# Patient Record
Sex: MALE | Race: WHITE | Employment: OTHER | URBAN - METROPOLITAN AREA
[De-identification: names, ages, dates, MRNs, and addresses within clinical notes are randomized per-mention and may not be internally consistent; named-entity substitution may affect disease eponyms.]

---

## 2017-02-22 ENCOUNTER — OFFICE VISIT (OUTPATIENT)
Dept: CARDIOLOGY CLINIC | Age: 77
End: 2017-02-22

## 2017-02-22 VITALS
HEART RATE: 70 BPM | BODY MASS INDEX: 35.36 KG/M2 | HEIGHT: 70 IN | WEIGHT: 247 LBS | SYSTOLIC BLOOD PRESSURE: 128 MMHG | DIASTOLIC BLOOD PRESSURE: 85 MMHG

## 2017-02-22 DIAGNOSIS — I10 ESSENTIAL HYPERTENSION, BENIGN: ICD-10-CM

## 2017-02-22 DIAGNOSIS — I48.20 CHRONIC ATRIAL FIBRILLATION (HCC): ICD-10-CM

## 2017-02-22 DIAGNOSIS — K92.2 GASTROINTESTINAL HEMORRHAGE, UNSPECIFIED GASTROINTESTINAL HEMORRHAGE TYPE: Primary | ICD-10-CM

## 2017-02-22 DIAGNOSIS — E11.9 TYPE 2 DIABETES MELLITUS WITHOUT COMPLICATION, UNSPECIFIED LONG TERM INSULIN USE STATUS: ICD-10-CM

## 2017-02-22 DIAGNOSIS — S06.5XAA SUBDURAL HEMATOMA: ICD-10-CM

## 2017-02-22 RX ORDER — ACETAMINOPHEN 325 MG/1
TABLET ORAL
COMMUNITY
End: 2017-09-05

## 2017-02-22 RX ORDER — LISINOPRIL AND HYDROCHLOROTHIAZIDE 12.5; 2 MG/1; MG/1
TABLET ORAL DAILY
COMMUNITY
End: 2017-09-05

## 2017-02-22 NOTE — MR AVS SNAPSHOT
Visit Information Date & Time Provider Department Dept. Phone Encounter #  
 2/22/2017 10:30 AM Curry Torres MD [facility] [phone] [id number] Follow-up Instructions Return in about 6 months (around 8/22/2017). Your Appointments 8/23/2017  9:45 AM  
Follow Up with Curry Torres MD  
[facility] ([facility]) Appt Note: 6 month follow up  
 [address] [place] [facility] [facility]ly [facility] [address]  
  
   
 [address] [id number] [place] [address] Upcoming Health Maintenance Date Due HEMOGLOBIN A1C Q6M 1940 FOOT EXAM Q1 2/14/1949 MICROALBUMIN Q1 2/14/1949 EYE EXAM RETINAL OR DILATED Q1 2/14/1949 DTaP/Tdap/Td series (1 - Tdap) 2/14/1960 ZOSTER VACCINE AGE 60> 2/14/1999 GLAUCOMA SCREENING Q2Y 2/14/2004 Pneumococcal 65+ Low/Medium Risk (1 of 2 - PCV13) 2/14/2004 MEDICARE YEARLY EXAM 2/14/2004 LIPID PANEL Q1 1/8/2016 INFLUENZA AGE 9 TO ADULT 8/1/2016 Allergies as of 2/22/2017  Review Complete On: 10/26/2016 By: Curry Torres MD  
  
 Severity Noted Reaction Type Reactions Aleve [Naproxen Sodium]  03/13/2015    Other (comments) bleeding Current Immunizations  Never Reviewed No immunizations on file. Not reviewed this visit You Were Diagnosed With   
  
 Codes Comments Gastrointestinal hemorrhage, unspecified gastrointestinal hemorrhage type    -  Primary ICD-10-CM: K92.2 ICD-9-CM: 578.9 Vitals BP  
  
  
  
  
  
 128/85 Vitals History BMI and BSA Data Body Mass Index Body Surface Area  
 35.44 kg/m 2 2.35 m 2 Preferred Pharmacy Pharmacy Name Phone [facility] [address]. [place]. [phone] Your Updated Medication List  
  
   
This list is accurate as of: 2/22/17 10:55 AM.  Always use your most recent med list.  
  
  
  
  
 ACTOS 30 mg tablet Generic drug:  pioglitazone Take 30 mg by mouth daily. aspirin delayed-release 81 mg tablet Take  by mouth daily. CENTRUM SILVER Tab tablet Generic drug:  multivitamins-minerals-lutein Take 1 Tab by mouth. finasteride 5 mg tablet Commonly known as:  PROSCAR Take 1 Tab by mouth daily. glimepiride 4 mg tablet Commonly known as:  AMARYL Take 4 mg by mouth every morning. 1/2 tablet twice a day  
  
 hydroCHLOROthiazide 12.5 mg tablet Commonly known as:  HYDRODIURIL Take 1 Tab by mouth daily. latanoprost 0.005 % ophthalmic solution Commonly known as:  Justin Fitch Administer 1 Drop to both eyes daily. levETIRAcetam 750 mg tablet Commonly known as:  KEPPRA Take 750 mg by mouth two (2) times a day. lisinopril-hydroCHLOROthiazide 20-12.5 mg per tablet Commonly known as:  Newby Bucy Take  by mouth daily. metFORMIN 1,000 mg tablet Commonly known as:  GLUCOPHAGE Take 1,000 mg by mouth two (2) times daily (with meals). metoprolol succinate 100 mg tablet Commonly known as:  TOPROL-XL Take 1 Tab by mouth daily. mirabegron ER 25 mg ER tablet Commonly known as:  MYRBETRIQ Take 1 Tab by mouth daily. PLAVIX 75 mg Tab Generic drug:  clopidogrel Take  by mouth daily. pravastatin 80 mg tablet Commonly known as:  PRAVACHOL Take 40 mg by mouth daily. PriLOSEC 20 mg capsule Generic drug:  omeprazole Take 20 mg by mouth daily. quinapril 20 mg tablet Commonly known as:  ACCUPRIL Take 1 Tab by mouth daily. tamsulosin 0.4 mg capsule Commonly known as:  FLOMAX Take 1 Cap by mouth daily. TYLENOL 325 mg tablet Generic drug:  acetaminophen Take  by mouth. 2 tabs as needed We Performed the Following REFERRAL TO GASTROENTEROLOGY [JAE41 Custom] Comments:  
 Please evaluate patient for h/o GI bleed. Needs anticoagulation Follow-up Instructions Return in about 6 months (around 8/22/2017). Referral Information Referral ID Referred By Referred To  
  
 [id number] Last Reddy Not Available Visits Status Start Date End Date 1 New Request 2/22/17 2/22/18 If your referral has a status of pending review or denied, additional information will be sent to support the outcome of this decision. Patient Instructions High Blood Pressure: Care Instructions Your Care Instructions If your blood pressure is usually above 140/90, you have high blood pressure, or hypertension. That means the top number is 140 or higher or the bottom number is 90 or higher, or both. Despite what a lot of people think, high blood pressure usually doesn't cause headaches or make you feel dizzy or lightheaded. It usually has no symptoms. But it does increase your risk for heart attack, stroke, and kidney or eye damage. The higher your blood pressure, the more your risk increases. Your doctor will give you a goal for your blood pressure. Your goal will be based on your health and your age. An example of a goal is to keep your blood pressure below 140/90. Lifestyle changes, such as eating healthy and being active, are always important to help lower blood pressure. You might also take medicine to reach your blood pressure goal. 
Follow-up care is a key part of your treatment and safety. Be sure to make and go to all appointments, and call your doctor if you are having problems. It's also a good idea to know your test results and keep a list of the medicines you take. How can you care for yourself at home? Medical treatment · If you stop taking your medicine, your blood pressure will go back up. You may take one or more types of medicine to lower your blood pressure. Be safe with medicines. Take your medicine exactly as prescribed. Call your doctor if you think you are having a problem with your medicine. · Talk to your doctor before you start taking aspirin every day. Aspirin can help certain people lower their risk of a heart attack or stroke. But taking aspirin isn't right for everyone, because it can cause serious bleeding. · See your doctor regularly. You may need to see the doctor more often at first or until your blood pressure comes down. · If you are taking blood pressure medicine, talk to your doctor before you take decongestants or anti-inflammatory medicine, such as ibuprofen. Some of these medicines can raise blood pressure. · Learn how to check your blood pressure at home. Lifestyle changes · Stay at a healthy weight. This is especially important if you put on weight around the waist. Losing even 10 pounds can help you lower your blood pressure. · If your doctor recommends it, get more exercise. Walking is a good choice. Bit by bit, increase the amount you walk every day. Try for at least 30 minutes on most days of the week. You also may want to swim, bike, or do other activities. · Avoid or limit alcohol. Talk to your doctor about whether you can drink any alcohol. · Try to limit how much sodium you eat to less than 2,300 milligrams (mg) a day. Your doctor may ask you to try to eat less than 1,500 mg a day. · Eat plenty of fruits (such as bananas and oranges), vegetables, legumes, whole grains, and low-fat dairy products. · Lower the amount of saturated fat in your diet. Saturated fat is found in animal products such as milk, cheese, and meat. Limiting these foods may help you lose weight and also lower your risk for heart disease. · Do not smoke. Smoking increases your risk for heart attack and stroke. If you need help quitting, talk to your doctor about stop-smoking programs and medicines. These can increase your chances of quitting for good. When should you call for help? Call 911 anytime you think you may need emergency care.  This may mean having symptoms that suggest that your blood pressure is causing a serious heart or blood vessel problem. Your blood pressure may be over 180/110. For example, call 911 if: 
· You have symptoms of a heart attack. These may include: ¨ Chest pain or pressure, or a strange feeling in the chest. 
¨ Sweating. ¨ Shortness of breath. ¨ Nausea or vomiting. ¨ Pain, pressure, or a strange feeling in the back, neck, jaw, or upper belly or in one or both shoulders or arms. ¨ Lightheadedness or sudden weakness. ¨ A fast or irregular heartbeat. · You have symptoms of a stroke. These may include: 
¨ Sudden numbness, tingling, weakness, or loss of movement in your face, arm, or leg, especially on only one side of your body. ¨ Sudden vision changes. ¨ Sudden trouble speaking. ¨ Sudden confusion or trouble understanding simple statements. ¨ Sudden problems with walking or balance. ¨ A sudden, severe headache that is different from past headaches. · You have severe back or belly pain. Do not wait until your blood pressure comes down on its own. Get help right away. Call your doctor now or seek immediate care if: 
· Your blood pressure is much higher than normal (such as 180/110 or higher), but you don't have symptoms. · You think high blood pressure is causing symptoms, such as: ¨ Severe headache. ¨ Blurry vision. Watch closely for changes in your health, and be sure to contact your doctor if: 
· Your blood pressure measures 140/90 or higher at least 2 times. That means the top number is 140 or higher or the bottom number is 90 or higher, or both. · You think you may be having side effects from your blood pressure medicine. · Your blood pressure is usually normal, but it goes above normal at least 2 times. Where can you learn more? Go to http://ren-peggy.info/. Enter F993 in the search box to learn more about \"High Blood Pressure: Care Instructions. \" Current as of: August 8, 2016 Content Version: 11.1 © 3245-9313 [employer], [employer]. Care instructions adapted under license by [employer] (which disclaims liability or warranty for this information). If you have questions about a medical condition or this instruction, always ask your healthcare professional. [address] any warranty or liability for your use of this information. Will fax patients demo's and records over to  [facility] office and they will contact patient with an appointment. Introducing [facility]! Alix Landry introduces [employer] media patient portal. Now you can access parts of your medical record, email your doctor's office, and request medication refills online. 1. In your internet browser, go to [email or web address] 2. Click on the First Time User? Click Here link in the Sign In box. You will see the New Member Sign Up page. 3. Enter your [employer] media Access Code exactly as it appears below. You will not need to use this code after youve completed the sign-up process. If you do not sign up before the expiration date, you must request a new code. · [employer] media Access Code: [email or web address] Expires: 5/23/2017 10:13 AM 
 
4. Enter the last four digits of your Social Security Number (xxxx) and Date of Birth (mm/dd/yyyy) as indicated and click Submit. You will be taken to the next sign-up page. 5. Create a Mark media ID. This will be your Mark media login ID and cannot be changed, so think of one that is secure and easy to remember. 6. Create a [employer] media password. You can change your password at any time. 7. Enter your Password Reset Question and Answer. This can be used at a later time if you forget your password. 8. Enter your e-mail address. You will receive e-mail notification when new information is available in [address] 9. Click Sign Up. You can now view and download portions of your medical record. 10. Click the Download Summary menu link to download a portable copy of your medical information. If you have questions, please visit the Frequently Asked Questions section of the [employer] website. Remember, [employer] is NOT to be used for urgent needs. For medical emergencies, dial 911. Now available from your iPhone and Android! Please provide this summary of care documentation to your next provider. Your primary care clinician is listed as Russell Del Castillo. If you have any questions after today's visit, please call [phone].

## 2017-02-22 NOTE — PROGRESS NOTES
1. Have you been to the ER, urgent care clinic since your last visit? Hospitalized since your last visit? Yes Where: [facility]    2. Have you seen or consulted any other health care providers outside of the [address] [place] [address] since your last visit? Include any pap smears or colon screening. Yes Where: pcp     3. Since your last visit, have you had any of the following symptoms? no       4. Have you had any blood work, X-rays or cardiac testing? Yes Where: [facility]       5. Where do you normally have your labs drawn? 6. Do you need any refills today?    no

## 2017-02-22 NOTE — PATIENT INSTRUCTIONS

## 2017-02-28 NOTE — PROGRESS NOTES
HISTORY OF PRESENT ILLNESS  Dax Duran is a 65 y.o. male. Hypertension   The history is provided by the patient. This is a chronic problem. The current episode started more than 1 week ago. The problem occurs every several days. The problem has been gradually improving. Associated symptoms include shortness of breath. Pertinent negatives include no chest pain. Shortness of Breath   The history is provided by the patient. This is a chronic problem. The problem occurs intermittently. The current episode started more than 1 week ago. The problem has been rapidly improving. Pertinent negatives include no ear pain, no neck pain, no wheezing, no chest pain, no rash and no leg swelling. Palpitations    The history is provided by the patient. This is a chronic problem. The current episode started more than 1 week ago. The problem has not changed since onset. The problem occurs every several days. Associated symptoms include shortness of breath. Pertinent negatives include no chest pain. Risk factors include dyslipidemia, diabetes mellitus, hypertension and male gender. His past medical history is significant for hypertension and atrial fibrillation. Review of Systems   Constitutional: Negative for chills and weight loss. HENT: Negative for ear pain and hearing loss. Eyes: Negative for blurred vision. Respiratory: Positive for shortness of breath. Negative for wheezing and stridor. Cardiovascular: Negative for chest pain and leg swelling. Gastrointestinal: Negative for heartburn. Musculoskeletal: Negative for myalgias and neck pain. Skin: Negative for rash. Neurological: Negative for tingling, tremors, focal weakness and loss of consciousness. Psychiatric/Behavioral: Negative for depression and suicidal ideas. No family history on file.     Past Medical History:   Diagnosis Date    Arthritis     Cardiomegaly     Diabetes ([address]     Essential hypertension, benign     Hyperchloremia  Hypercholesteremia     Hypercholesteremia     Hypertension     Hypertriglyceridemia     Kidney stone     Nonspecific abnormal electrocardiogram (ECG) (EKG)     JUNCTIONAL RHYTHM    Nonspecific abnormal electrocardiogram (ECG) (EKG)     JUNCTIONAL RHYTHM     Other and unspecified angina pectoris ([address] 10/17/2014    exertional angina stable better now     Pre-operative cardiovascular examination     Shortness of breath 10/17/2014    exertional hcvd     Type II or unspecified type diabetes mellitus without mention of complication, not stated as uncontrolled     Type II or unspecified type diabetes mellitus without mention of complication, not stated as uncontrolled        Past Surgical History:   Procedure Laterality Date    HX COLECTOMY      HX HERNIA REPAIR      HX ORTHOPAEDIC  5-8-2012    right knee replacement    HX POLYPECTOMY      HX TONSILLECTOMY         Social History   Substance Use Topics    Smoking status: Never Smoker    Smokeless tobacco: Never Used    Alcohol use No       Allergies   Allergen Reactions    Aleve [Naproxen Sodium] Other (comments)     bleeding       Outpatient Prescriptions Marked as Taking for the 2/22/17 encounter (Office Visit) with Becca Cates MD   Medication Sig Dispense Refill    lisinopril-hydroCHLOROthiazide (PRINZIDE, ZESTORETIC) 20-12.5 mg per tablet Take  by mouth daily.  acetaminophen (TYLENOL) 325 mg tablet Take  by mouth. 2 tabs as needed      metoprolol succinate (TOPROL-XL) 100 mg tablet Take 1 Tab by mouth daily. 30 Tab 6    quinapril (ACCUPRIL) 20 mg tablet Take 1 Tab by mouth daily. (Patient taking differently: Take 10 mg by mouth daily.) 30 Tab 4    levETIRAcetam (KEPPRA) 750 mg tablet Take 750 mg by mouth two (2) times a day.  aspirin delayed-release 81 mg tablet Take  by mouth daily.  omeprazole (PRILOSEC) 20 mg capsule Take 20 mg by mouth daily.  finasteride (PROSCAR) 5 mg tablet Take 1 Tab by mouth daily.  80 Tab 3    tamsulosin (FLOMAX) 0.4 mg capsule Take 1 Cap by mouth daily. 90 Cap 3    pioglitazone (ACTOS) 30 mg tablet Take 30 mg by mouth daily.  multivitamins-minerals-lutein (CENTRUM SILVER) Tab Take 1 Tab by mouth.  glimepiride (AMARYL) 4 mg tablet Take 4 mg by mouth every morning. 1/2 tablet twice a day       latanoprost (XALATAN) 0.005 % ophthalmic solution Administer 1 Drop to both eyes daily.  metFORMIN (GLUCOPHAGE) 1,000 mg tablet Take 1,000 mg by mouth two (2) times daily (with meals).  pravastatin (PRAVACHOL) 80 mg tablet Take 40 mg by mouth daily. Visit Vitals    /85    Pulse 70    Ht 5' 10\" (1.778 m)    Wt 112 kg (247 lb)    BMI 35.44 kg/m2     Physical Exam   Constitutional: He is oriented to person, place, and time. He appears well-developed and well-nourished. No distress. HENT:   Head: Atraumatic. Mouth/Throat: No oropharyngeal exudate. Eyes: Conjunctivae are normal. No scleral icterus. Neck: Normal range of motion. Neck supple. No JVD present. No tracheal deviation present. No thyromegaly present. Cardiovascular: Normal rate. An irregularly irregular rhythm present. Exam reveals no gallop. No murmur heard. Pulses:       Dorsalis pedis pulses are 1+ on the right side, and 1+ on the left side. Posterior tibial pulses are 1+ on the right side, and 1+ on the left side. Pulmonary/Chest: Effort normal and breath sounds normal. No stridor. No respiratory distress. He has no wheezes. He has no rales. He exhibits no tenderness. Abdominal: Soft. There is no tenderness. There is no rebound and no guarding. Musculoskeletal: Normal range of motion. He exhibits no edema or tenderness. Lymphadenopathy:     He has no cervical adenopathy. Neurological: He is alert and oriented to person, place, and time. He exhibits normal muscle tone. Skin: Skin is warm. He is not diaphoretic. Psychiatric: He has a normal mood and affect.  His behavior is normal.     ekg atrial fibrillation with no acute st-t changes. ECHO 1/2016:    VERY TECHNICALLY DIFFICULT STUDY. NORMAL LEFT VENTRICULAR SYSTOLIC FUNCTION WITH AN ESTIMATED EJECTION FRACTION OF 55%. NORMAL DIASTOLIC FUNCTION. DILATED LEFT ATRIUM. NO HEMODYNAMICALLY SIGNIFICANT VALVULAR PATHOLOGY. NO EVIDENCE OF PULMONARY HYPERTENSION DUE TO LACK OF TR JET. NO PREVIOUS REPORT FOR COMPARISON. ASSESSMENT and PLAN    ICD-10-CM ICD-9-CM    1. Gastrointestinal hemorrhage, unspecified gastrointestinal hemorrhage type K92.2 578.9 REFERRAL TO GASTROENTEROLOGY   2. Essential hypertension, benign I10 401.1    3. Chronic atrial fibrillation (HCC) I48.2 427.31    4. Type 2 diabetes mellitus without complication, unspecified long term insulin use status (HCC) E11.9 250.00    5. Subdural hematoma (HCC) I62.00 432.1      Orders Placed This Encounter    REFERRAL TO GASTROENTEROLOGY     Referral Priority:   Routine     Referral Type:   Consultation     Referral Reason:   Specialty Services Required     Follow-up Disposition:  Return in about 6 months (around 8/22/2017). reviewed diet, exercise and weight control  cardiovascular risk and specific lipid/LDL goals reviewed  use of aspirin to prevent MI and TIA's discussed. 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 Patient okay to take oral anticoagulation per neurosurgeon. Will obtain Gi evaluation as he doesn't follow with his gastroenterologist.  Will consider anticoagulation after GI evaluation- as per patient, he had received 3-4 units of PRBC in the last admission. Above plan discussed with patient.

## 2017-03-02 ENCOUNTER — APPOINTMENT (OUTPATIENT)
Dept: PHYSICAL THERAPY | Age: 77
End: 2017-03-02

## 2017-08-23 ENCOUNTER — OFFICE VISIT (OUTPATIENT)
Dept: CARDIOLOGY CLINIC | Age: 77
End: 2017-08-23

## 2017-08-23 VITALS
DIASTOLIC BLOOD PRESSURE: 76 MMHG | WEIGHT: 248 LBS | SYSTOLIC BLOOD PRESSURE: 119 MMHG | BODY MASS INDEX: 35.5 KG/M2 | HEIGHT: 70 IN | HEART RATE: 84 BPM

## 2017-08-23 DIAGNOSIS — K92.2 GASTROINTESTINAL HEMORRHAGE, UNSPECIFIED GASTROINTESTINAL HEMORRHAGE TYPE: ICD-10-CM

## 2017-08-23 DIAGNOSIS — E78.00 HYPERCHOLESTEREMIA: ICD-10-CM

## 2017-08-23 DIAGNOSIS — I10 ESSENTIAL HYPERTENSION, BENIGN: ICD-10-CM

## 2017-08-23 DIAGNOSIS — E11.9 TYPE 2 DIABETES MELLITUS WITHOUT COMPLICATION, UNSPECIFIED LONG TERM INSULIN USE STATUS: ICD-10-CM

## 2017-08-23 DIAGNOSIS — S06.5XAA SUBDURAL HEMATOMA: ICD-10-CM

## 2017-08-23 DIAGNOSIS — I48.20 CHRONIC ATRIAL FIBRILLATION (HCC): Primary | ICD-10-CM

## 2017-08-23 NOTE — PROGRESS NOTES
1. Have you been to the ER, urgent care clinic since your last visit? Hospitalized since your last visit? No    2. Have you seen or consulted any other health care providers outside of the [address] [place] [address] since your last visit? Include any pap smears or colon screening. Yes Where: PCP     3. Since your last visit, have you had any of the following symptoms? .           4. Have you had any blood work, X-rays or cardiac testing? Yes Where: PCP Office             5.  Where do you normally have your labs drawn? PCP Office    6. Do you need any refills today?    No

## 2017-08-23 NOTE — MR AVS SNAPSHOT
Visit Information Date & Time Provider Department Dept. Phone Encounter #  
 8/23/2017  9:45 AM Ana Murguia MD [facility] [phone] Follow-up Instructions Return in about 6 months (around 2/23/2018). Follow-up and Disposition History Your Appointments 9/5/2017 12:00 PM  
ESTABLISHED PATIENT with Walter Menjivar MD  
[facility] [address] [facility]) Appt Note: flow. pvr,ua ,med refill  
 [address] [facility] [address] Upcoming Health Maintenance Date Due HEMOGLOBIN A1C Q6M 1940 FOOT EXAM Q1 2/14/1949 MICROALBUMIN Q1 2/14/1949 EYE EXAM RETINAL OR DILATED Q1 2/14/1949 DTaP/Tdap/Td series (1 - Tdap) 2/14/1960 ZOSTER VACCINE AGE 60> 12/14/1998 GLAUCOMA SCREENING Q2Y 2/14/2004 Pneumococcal 65+ Low/Medium Risk (1 of 2 - PCV13) 2/14/2004 MEDICARE YEARLY EXAM 2/14/2004 LIPID PANEL Q1 1/8/2016 INFLUENZA AGE 9 TO ADULT 8/1/2017 Allergies as of 8/23/2017  Review Complete On: 8/23/2017 By: Ana Murguia MD  
  
 Severity Noted Reaction Type Reactions Aleve [Naproxen Sodium]  03/13/2015    Other (comments) bleeding Current Immunizations  Never Reviewed No immunizations on file. Not reviewed this visit You Were Diagnosed With   
  
 Codes Comments Chronic atrial fibrillation (HCC)    -  Primary ICD-10-CM: W38.0 ICD-9-CM: 427.31 Essential hypertension, benign     ICD-10-CM: I10 
ICD-9-CM: 401.1 Hypercholesteremia     ICD-10-CM: E78.00 ICD-9-CM: 272.0 Type 2 diabetes mellitus without complication, unspecified long term insulin use status (HCC)     ICD-10-CM: E11.9 ICD-9-CM: 250.00 Subdural hematoma (HCC)     ICD-10-CM: I62.00 ICD-9-CM: 432.1 Gastrointestinal hemorrhage, unspecified gastrointestinal hemorrhage type     ICD-10-CM: K92.2 ICD-9-CM: 578.9 Vitals BP Pulse Height(growth percentile) Weight(growth percentile) BMI Smoking Status 119/76 84 5' 10\" (1.778 m) 248 lb (112.5 kg) 35.58 kg/m2 Never Smoker Vitals History BMI and BSA Data Body Mass Index Body Surface Area 35.58 kg/m 2 2.36 m 2 Preferred Pharmacy Pharmacy Name Phone [address] [phone] Your Updated Medication List  
  
   
This list is accurate as of: 8/23/17 10:31 AM.  Always use your most recent med list.  
  
  
  
  
 ACTOS 30 mg tablet Generic drug:  pioglitazone Take 30 mg by mouth daily. aspirin delayed-release 81 mg tablet Take  by mouth daily. CENTRUM SILVER Tab tablet Generic drug:  multivitamins-minerals-lutein Take 1 Tab by mouth. finasteride 5 mg tablet Commonly known as:  PROSCAR Take 1 Tab by mouth daily. glimepiride 4 mg tablet Commonly known as:  AMARYL Take 4 mg by mouth every morning. 1/2 tablet twice a day  
  
 hydroCHLOROthiazide 12.5 mg tablet Commonly known as:  HYDRODIURIL Take 1 Tab by mouth daily. latanoprost 0.005 % ophthalmic solution Commonly known as:  Tigist Adjutant Administer 1 Drop to both eyes daily. levETIRAcetam 750 mg tablet Commonly known as:  KEPPRA Take 750 mg by mouth two (2) times a day. lisinopril-hydroCHLOROthiazide 20-12.5 mg per tablet Commonly known as:  Terryl Range Take  by mouth daily. metFORMIN 1,000 mg tablet Commonly known as:  GLUCOPHAGE Take 1,000 mg by mouth two (2) times daily (with meals). metoprolol succinate 100 mg tablet Commonly known as:  TOPROL-XL Take 1 Tab by mouth daily. mirabegron ER 25 mg ER tablet Commonly known as:  MYRBETRIQ Take 1 Tab by mouth daily. PLAVIX 75 mg Tab Generic drug:  clopidogrel Take  by mouth daily. pravastatin 80 mg tablet Commonly known as:  PRAVACHOL  
 Take 40 mg by mouth daily. PriLOSEC 20 mg capsule Generic drug:  omeprazole Take 20 mg by mouth daily. quinapril 20 mg tablet Commonly known as:  ACCUPRIL Take 1 Tab by mouth daily. tamsulosin 0.4 mg capsule Commonly known as:  FLOMAX Take 1 Cap by mouth daily. TYLENOL 325 mg tablet Generic drug:  acetaminophen Take  by mouth. 2 tabs as needed Follow-up Instructions Return in about 6 months (around 2/23/2018). Patient Instructions High Blood Pressure: Care Instructions Your Care Instructions If your blood pressure is usually above 140/90, you have high blood pressure, or hypertension. That means the top number is 140 or higher or the bottom number is 90 or higher, or both. Despite what a lot of people think, high blood pressure usually doesn't cause headaches or make you feel dizzy or lightheaded. It usually has no symptoms. But it does increase your risk for heart attack, stroke, and kidney or eye damage. The higher your blood pressure, the more your risk increases. Your doctor will give you a goal for your blood pressure. Your goal will be based on your health and your age. An example of a goal is to keep your blood pressure below 140/90. Lifestyle changes, such as eating healthy and being active, are always important to help lower blood pressure. You might also take medicine to reach your blood pressure goal. 
Follow-up care is a key part of your treatment and safety. Be sure to make and go to all appointments, and call your doctor if you are having problems. It's also a good idea to know your test results and keep a list of the medicines you take. How can you care for yourself at home? Medical treatment · If you stop taking your medicine, your blood pressure will go back up. You may take one or more types of medicine to lower your blood pressure. Be safe with medicines. Take your medicine exactly as prescribed.  Call your doctor if you think you are having a problem with your medicine. · Talk to your doctor before you start taking aspirin every day. Aspirin can help certain people lower their risk of a heart attack or stroke. But taking aspirin isn't right for everyone, because it can cause serious bleeding. · See your doctor regularly. You may need to see the doctor more often at first or until your blood pressure comes down. · If you are taking blood pressure medicine, talk to your doctor before you take decongestants or anti-inflammatory medicine, such as ibuprofen. Some of these medicines can raise blood pressure. · Learn how to check your blood pressure at home. Lifestyle changes · Stay at a healthy weight. This is especially important if you put on weight around the waist. Losing even 10 pounds can help you lower your blood pressure. · If your doctor recommends it, get more exercise. Walking is a good choice. Bit by bit, increase the amount you walk every day. Try for at least 30 minutes on most days of the week. You also may want to swim, bike, or do other activities. · Avoid or limit alcohol. Talk to your doctor about whether you can drink any alcohol. · Try to limit how much sodium you eat to less than 2,300 milligrams (mg) a day. Your doctor may ask you to try to eat less than 1,500 mg a day. · Eat plenty of fruits (such as bananas and oranges), vegetables, legumes, whole grains, and low-fat dairy products. · Lower the amount of saturated fat in your diet. Saturated fat is found in animal products such as milk, cheese, and meat. Limiting these foods may help you lose weight and also lower your risk for heart disease. · Do not smoke. Smoking increases your risk for heart attack and stroke. If you need help quitting, talk to your doctor about stop-smoking programs and medicines. These can increase your chances of quitting for good. When should you call for help? Call 911 anytime you think you may need emergency care. This may mean having symptoms that suggest that your blood pressure is causing a serious heart or blood vessel problem. Your blood pressure may be over 180/110. For example, call 911 if: 
· You have symptoms of a heart attack. These may include: ¨ Chest pain or pressure, or a strange feeling in the chest. 
¨ Sweating. ¨ Shortness of breath. ¨ Nausea or vomiting. ¨ Pain, pressure, or a strange feeling in the back, neck, jaw, or upper belly or in one or both shoulders or arms. ¨ Lightheadedness or sudden weakness. ¨ A fast or irregular heartbeat. · You have symptoms of a stroke. These may include: 
¨ Sudden numbness, tingling, weakness, or loss of movement in your face, arm, or leg, especially on only one side of your body. ¨ Sudden vision changes. ¨ Sudden trouble speaking. ¨ Sudden confusion or trouble understanding simple statements. ¨ Sudden problems with walking or balance. ¨ A sudden, severe headache that is different from past headaches. · You have severe back or belly pain. Do not wait until your blood pressure comes down on its own. Get help right away. Call your doctor now or seek immediate care if: 
· Your blood pressure is much higher than normal (such as 180/110 or higher), but you don't have symptoms. · You think high blood pressure is causing symptoms, such as: ¨ Severe headache. ¨ Blurry vision. Watch closely for changes in your health, and be sure to contact your doctor if: 
· Your blood pressure measures 140/90 or higher at least 2 times. That means the top number is 140 or higher or the bottom number is 90 or higher, or both. · You think you may be having side effects from your blood pressure medicine. · Your blood pressure is usually normal, but it goes above normal at least 2 times. Where can you learn more? Go to http://ren-peggy.info/. Enter R014 in the search box to learn more about \"High Blood Pressure: Care Instructions. \" Current as of: August 8, 2016 Content Version: 11.3 © 8678-4955 [employer]. Care instructions adapted under license by [employer] (which disclaims liability or warranty for this information). If you have questions about a medical condition or this instruction, always ask your healthcare professional. [address] any warranty or liability for your use of this information. Patient Instructions History Introducing [facility]! Elle Raymond introduces [employer] patient portal. Now you can access parts of your medical record, email your doctor's office, and request medication refills online. 1. In your internet browser, go to [email or web address] 2. Click on the First Time User? Click Here link in the Sign In box. You will see the New Member Sign Up page. 3. Enter your Thoora Access Code exactly as it appears below. You will not need to use this code after youve completed the sign-up process. If you do not sign up before the expiration date, you must request a new code. · [employer] Access Code: [email or web address] Expires: 11/21/2017  9:38 AM 
 
4. Enter the last four digits of your Social Security Number (xxxx) and Date of Birth (mm/dd/yyyy) as indicated and click Submit. You will be taken to the next sign-up page. 5. Create a Thoora ID. This will be your Thoora login ID and cannot be changed, so think of one that is secure and easy to remember. 6. Create a Thoora password. You can change your password at any time. 7. Enter your Password Reset Question and Answer. This can be used at a later time if you forget your password. 8. Enter your e-mail address. You will receive e-mail notification when new information is available in [address] 9. Click Sign Up. You can now view and download portions of your medical record. 10. Click the Download Summary menu link to download a portable copy of your medical information. If you have questions, please visit the Frequently Asked Questions section of the [employer] website. Remember, [employer] is NOT to be used for urgent needs. For medical emergencies, dial 911. Now available from your iPhone and Android! Please provide this summary of care documentation to your next provider. Your primary care clinician is listed as Igor Joss. If you have any questions after today's visit, please call [phone].

## 2017-08-23 NOTE — PATIENT INSTRUCTIONS

## 2017-08-23 NOTE — PROGRESS NOTES
HISTORY OF PRESENT ILLNESS  Dax Yan is a 65 y.o. male. Hypertension   The history is provided by the patient. This is a chronic problem. The current episode started more than 1 week ago. The problem occurs every several days. The problem has been gradually improving. Associated symptoms include shortness of breath. Pertinent negatives include no chest pain. Shortness of Breath   The history is provided by the patient. This is a chronic problem. The problem occurs intermittently. The current episode started more than 1 week ago. The problem has been rapidly improving. Pertinent negatives include no ear pain, no neck pain, no wheezing, no chest pain, no rash and no leg swelling. Palpitations    The history is provided by the patient. This is a chronic problem. The current episode started more than 1 week ago. The problem has not changed since onset. The problem occurs every several days. Associated symptoms include shortness of breath. Pertinent negatives include no chest pain. Risk factors include dyslipidemia, diabetes mellitus, hypertension and male gender. His past medical history is significant for hypertension and atrial fibrillation. Review of Systems   Constitutional: Negative for chills and weight loss. HENT: Negative for ear pain and hearing loss. Eyes: Negative for blurred vision. Respiratory: Positive for shortness of breath. Negative for wheezing and stridor. Cardiovascular: Positive for palpitations. Negative for chest pain and leg swelling. Gastrointestinal: Negative for heartburn. Musculoskeletal: Negative for myalgias and neck pain. Skin: Negative for rash. Neurological: Negative for tingling, tremors, focal weakness and loss of consciousness. Psychiatric/Behavioral: Negative for depression and suicidal ideas. No family history on file.     Past Medical History:   Diagnosis Date    Arthritis     Cardiomegaly     Diabetes ([facility] [address]     Essential hypertension, benign     Hyperchloremia     Hypercholesteremia     Hypercholesteremia     Hypertension     Hypertriglyceridemia     Kidney stone     Nonspecific abnormal electrocardiogram (ECG) (EKG)     JUNCTIONAL RHYTHM    Nonspecific abnormal electrocardiogram (ECG) (EKG)     JUNCTIONAL RHYTHM     Other and unspecified angina pectoris 10/17/2014    exertional angina stable better now     Pre-operative cardiovascular examination     Shortness of breath 10/17/2014    exertional hcvd     Type II or unspecified type diabetes mellitus without mention of complication, not stated as uncontrolled     Type II or unspecified type diabetes mellitus without mention of complication, not stated as uncontrolled        Past Surgical History:   Procedure Laterality Date    HX COLECTOMY      HX HERNIA REPAIR      HX ORTHOPAEDIC  5-8-2012    right knee replacement    HX POLYPECTOMY      HX TONSILLECTOMY         Social History   Substance Use Topics    Smoking status: Never Smoker    Smokeless tobacco: Never Used    Alcohol use No       Allergies   Allergen Reactions    Aleve [Naproxen Sodium] Other (comments)     bleeding       Outpatient Prescriptions Marked as Taking for the 8/23/17 encounter (Office Visit) with Christopher Bence, MD   Medication Sig Dispense Refill    tamsulosin (FLOMAX) 0.4 mg capsule Take 1 Cap by mouth daily. 90 Cap 0    lisinopril-hydroCHLOROthiazide (PRINZIDE, ZESTORETIC) 20-12.5 mg per tablet Take  by mouth daily.  acetaminophen (TYLENOL) 325 mg tablet Take  by mouth. 2 tabs as needed      metoprolol succinate (TOPROL-XL) 100 mg tablet Take 1 Tab by mouth daily. 30 Tab 6    quinapril (ACCUPRIL) 20 mg tablet Take 1 Tab by mouth daily. (Patient taking differently: Take 10 mg by mouth daily.) 30 Tab 4    levETIRAcetam (KEPPRA) 750 mg tablet Take 750 mg by mouth two (2) times a day.  aspirin delayed-release 81 mg tablet Take  by mouth daily.       omeprazole (PRILOSEC) 20 mg capsule Take 20 mg by mouth daily.  finasteride (PROSCAR) 5 mg tablet Take 1 Tab by mouth daily. 90 Tab 3    pioglitazone (ACTOS) 30 mg tablet Take 30 mg by mouth daily.  multivitamins-minerals-lutein (CENTRUM SILVER) Tab Take 1 Tab by mouth.  glimepiride (AMARYL) 4 mg tablet Take 4 mg by mouth every morning. 1/2 tablet twice a day       latanoprost (XALATAN) 0.005 % ophthalmic solution Administer 1 Drop to both eyes daily.  metFORMIN (GLUCOPHAGE) 1,000 mg tablet Take 1,000 mg by mouth two (2) times daily (with meals).  pravastatin (PRAVACHOL) 80 mg tablet Take 40 mg by mouth daily. Visit Vitals    /76    Pulse 84    Ht 5' 10\" (1.778 m)    Wt 112.5 kg (248 lb)    BMI 35.58 kg/m2     Physical Exam   Constitutional: He is oriented to person, place, and time. He appears well-developed and well-nourished. No distress. HENT:   Head: Atraumatic. Mouth/Throat: No oropharyngeal exudate. Eyes: Conjunctivae are normal. No scleral icterus. Neck: Normal range of motion. Neck supple. No JVD present. No tracheal deviation present. No thyromegaly present. Cardiovascular: Normal rate. An irregularly irregular rhythm present. Exam reveals no gallop. No murmur heard. Pulses:       Dorsalis pedis pulses are 1+ on the right side, and 1+ on the left side. Posterior tibial pulses are 1+ on the right side, and 1+ on the left side. Pulmonary/Chest: Effort normal and breath sounds normal. No stridor. No respiratory distress. He has no wheezes. He has no rales. He exhibits no tenderness. Abdominal: Soft. There is no tenderness. There is no rebound and no guarding. Musculoskeletal: Normal range of motion. He exhibits no edema or tenderness. Lymphadenopathy:     He has no cervical adenopathy. Neurological: He is alert and oriented to person, place, and time. He exhibits normal muscle tone. Skin: Skin is warm. He is not diaphoretic.    Psychiatric: He has a normal mood and affect. His behavior is normal.     ekg atrial fibrillation with no acute st-t changes. ECHO 1/2016:    VERY TECHNICALLY DIFFICULT STUDY. NORMAL LEFT VENTRICULAR SYSTOLIC FUNCTION WITH AN ESTIMATED EJECTION FRACTION OF 55%. NORMAL DIASTOLIC FUNCTION. DILATED LEFT ATRIUM. NO HEMODYNAMICALLY SIGNIFICANT VALVULAR PATHOLOGY. NO EVIDENCE OF PULMONARY HYPERTENSION DUE TO LACK OF TR JET. NO PREVIOUS REPORT FOR COMPARISON. ASSESSMENT and PLAN    ICD-10-CM ICD-9-CM    1. Chronic atrial fibrillation (HCC) I48.2 427.31    2. Essential hypertension, benign I10 401.1    3. Hypercholesteremia E78.00 272.0    4. Type 2 diabetes mellitus without complication, unspecified long term insulin use status (HCC) E11.9 250.00    5. Subdural hematoma (HCC) I62.00 432.1    6. Gastrointestinal hemorrhage, unspecified gastrointestinal hemorrhage type K92.2 578.9      No orders of the defined types were placed in this encounter. Follow-up Disposition:  Return in about 6 months (around 2/23/2018). reviewed diet, exercise and weight control  cardiovascular risk and specific lipid/LDL goals reviewed  use of aspirin to prevent MI and TIA's discussed. 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 Patient okay to take oral anticoagulation per neurosurgeon. He declined his last appointment with GI- now agrees to follow with GI  Will consider anticoagulation after GI evaluation- as per patient, he had received 3-4 units of PRBC in the last admission. Above plan discussed with patient.

## 2017-09-12 ENCOUNTER — TELEPHONE (OUTPATIENT)
Dept: CARDIOLOGY CLINIC | Age: 77
End: 2017-09-12

## 2017-09-12 DIAGNOSIS — I48.20 CHRONIC ATRIAL FIBRILLATION (HCC): Primary | ICD-10-CM

## 2017-09-12 NOTE — TELEPHONE ENCOUNTER
Patient called to discuss orders from Dr. Nicolasa Meckel. He will start eliquis 2.5mg by mouth twice daily for 2 weeks. He will call and report any bleeding during the 2 weeks, if no bleeding he will then begin 5mg twice daily and stop asa. He voices understanding and acceptance of this advice and will call back if any further questions or concerns.

## 2018-02-21 ENCOUNTER — OFFICE VISIT (OUTPATIENT)
Dept: CARDIOLOGY CLINIC | Age: 78
End: 2018-02-21

## 2018-02-21 VITALS
WEIGHT: 261 LBS | HEART RATE: 85 BPM | DIASTOLIC BLOOD PRESSURE: 94 MMHG | HEIGHT: 70 IN | SYSTOLIC BLOOD PRESSURE: 150 MMHG | BODY MASS INDEX: 37.37 KG/M2

## 2018-02-21 DIAGNOSIS — I10 ESSENTIAL HYPERTENSION: ICD-10-CM

## 2018-02-21 DIAGNOSIS — I48.91 ATRIAL FIBRILLATION, UNSPECIFIED TYPE (HCC): Primary | ICD-10-CM

## 2018-02-21 DIAGNOSIS — E11.9 TYPE 2 DIABETES MELLITUS WITHOUT COMPLICATION, UNSPECIFIED LONG TERM INSULIN USE STATUS: ICD-10-CM

## 2018-02-21 DIAGNOSIS — I50.32 CHRONIC DIASTOLIC CONGESTIVE HEART FAILURE (HCC): ICD-10-CM

## 2018-02-21 DIAGNOSIS — S06.5XAA SUBDURAL HEMATOMA: ICD-10-CM

## 2018-02-21 DIAGNOSIS — K92.2 GASTROINTESTINAL HEMORRHAGE, UNSPECIFIED GASTROINTESTINAL HEMORRHAGE TYPE: ICD-10-CM

## 2018-02-21 RX ORDER — QUINAPRIL 20 MG/1
20 TABLET ORAL DAILY
Qty: 90 TAB | Refills: 3 | Status: CANCELLED | OUTPATIENT
Start: 2018-02-21

## 2018-02-21 RX ORDER — QUINAPRIL 20 MG/1
20 TABLET ORAL DAILY
Qty: 90 TAB | Refills: 3 | Status: SHIPPED | OUTPATIENT
Start: 2018-02-21 | End: 2019-01-29 | Stop reason: SDUPTHER

## 2018-02-21 RX ORDER — MUPIROCIN 20 MG/G
OINTMENT TOPICAL DAILY
COMMUNITY

## 2018-02-21 RX ORDER — METOPROLOL TARTRATE 100 MG/1
50 TABLET ORAL DAILY
COMMUNITY
End: 2021-10-11

## 2018-02-21 NOTE — MR AVS SNAPSHOT
[address] [place] [address] 200 [facility] 
[phone] Patient: Shelby Yoon MRN: [id number] Visit Information Date & Time Provider Department Dept. Phone Encounter #  
 2/21/2018  9:45 AM Rina Lott MD [facility] [phone] [id number] Follow-up Instructions Return in about 3 months (around 5/21/2018). 4/3/2018 10:15 AM  
Any with Kavita Renner MD  
[facility] ([facility]) Appt Note: EP- BPH, LUTS, 3 month follow up Ronny Camarena [address] [place] [id number]  
[address] [facility] [address] [facility] [address] [facility] [address] Upcoming Health Maintenance Date Due HEMOGLOBIN A1C Q6M 1940 FOOT EXAM Q1 2/14/1949 MICROALBUMIN Q1 2/14/1949 EYE EXAM RETINAL OR DILATED Q1 2/14/1949 DTaP/Tdap/Td series (1 - Tdap) 2/14/1960 ZOSTER VACCINE AGE 60> 12/14/1998 GLAUCOMA SCREENING Q2Y 2/14/2004 Pneumococcal 65+ Low/Medium Risk (1 of 2 - PCV13) 2/14/2004 MEDICARE YEARLY EXAM 2/14/2004 LIPID PANEL Q1 1/8/2016 Allergies as of 2/21/2018  Review Complete On: 2/21/2018 By: Wilder Callejas LPN Severity Noted Reaction Type Reactions Aleve [Naproxen Sodium]  03/13/2015    Other (comments) bleeding Nsaids (Non-steroidal Anti-inflammatory Drug)  01/23/2016    Other (comments) Risk for gastric erosions Current Immunizations  Never Reviewed No immunizations on file. Not reviewed this visit You Were Diagnosed With   
  
 Codes Comments Atrial fibrillation, unspecified type ([facility]    -  Primary ICD-10-CM: I48.91 
ICD-9-CM: 427.31 Essential hypertension     ICD-10-CM: I10 
ICD-9-CM: 401.9 Type 2 diabetes mellitus without complication, unspecified long term insulin use status (HCC)     ICD-10-CM: E11.9 ICD-9-CM: 250.00  Subdural hematoma (HCC)     ICD-10-CM: I62.00 
 ICD-9-CM: 432.1 Gastrointestinal hemorrhage, unspecified gastrointestinal hemorrhage type     ICD-10-CM: K92.2 ICD-9-CM: 891. 9 Chronic diastolic congestive heart failure (HCC)     ICD-10-CM: I50.32 
ICD-9-CM: 428.32, 428.0 NYHA class III Vitals BP Pulse Height(growth percentile) Weight(growth percentile) BMI Smoking Status (!) 150/94 85 5' 10\" (1.778 m) 261 lb (118.4 kg) 37.45 kg/m2 Never Smoker Vitals History BMI and BSA Data Body Mass Index Body Surface Area  
 37.45 kg/m 2 2.42 m 2 Preferred Pharmacy Pharmacy Name Phone [address] [facility] [phone] Your Updated Medication List  
  
   
This list is accurate as of 2/21/18 10:08 AM.  Always use your most recent med list.  
  
  
  
  
 ACTOS 30 mg tablet Generic drug:  pioglitazone Take 30 mg by mouth daily. * apixaban 2.5 mg tablet Commonly known as:  Dulcy Legacy Take one tablet by mouth twice daily for 2 weeks. * ELIQUIS 5 mg tablet Generic drug:  apixaban  
  
 aspirin 81 mg chewable tablet 81 mg. CENTRUM SILVER Tab tablet Generic drug:  multivitamins-minerals-lutein Take 1 Tab by mouth. finasteride 5 mg tablet Commonly known as:  PROSCAR Take 1 Tab by mouth daily. glimepiride 4 mg tablet Commonly known as:  AMARYL Take 4 mg by mouth every morning. 1/2 tablet twice a day  
  
 glucose blood VI test strips strip Commonly known as:  ASCENSIA AUTODISC VI, ONE TOUCH ULTRA TEST VI  
100 Each.  
  
 latanoprost 0.005 % ophthalmic solution Commonly known as:  Gin Mccray Administer 1 Drop to both eyes daily. levETIRAcetam 750 mg tablet Commonly known as:  KEPPRA  
750 mg.  
  
 lisinopril-hydroCHLOROthiazide 20-12.5 mg per tablet Commonly known as:  PRINZIDE, ZESTORETIC  
  
 metFORMIN 1,000 mg tablet Commonly known as:  GLUCOPHAGE Take 1,000 mg by mouth two (2) times daily (with meals). metoprolol tartrate 100 mg IR tablet Commonly known as:  LOPRESSOR Take 100 mg by mouth daily. mirabegron ER 50 mg ER tablet Commonly known as:  MYRBETRIQ Take 1 Tab by mouth daily. mupirocin 2 % ointment Commonly known as:  [employer] Apply  to affected area daily. pravastatin 80 mg tablet Commonly known as:  PRAVACHOL Take 40 mg by mouth daily. PriLOSEC 20 mg capsule Generic drug:  omeprazole Take 20 mg by mouth daily. quinapril 20 mg tablet Commonly known as:  ACCUPRIL Take 1 Tab by mouth daily. sildenafil citrate 100 mg tablet Commonly known as:  VIAGRA Take 1 Tab by mouth daily as needed. tamsulosin 0.4 mg capsule Commonly known as:  FLOMAX Take 1 Cap by mouth daily. * Notice: This list has 2 medication(s) that are the same as other medications prescribed for you. Read the directions carefully, and ask your doctor or other care provider to review them with you. Prescriptions Sent to Pharmacy Refills  
 quinapril (ACCUPRIL) 20 mg tablet 3 Sig: Take 1 Tab by mouth daily. Class: Normal  
 Pharmacy: [address], [address] Ph #: [phone] Route: Oral  
  
We Performed the Following AMB POC EKG ROUTINE W/ 12 LEADS, INTER & REP [75651 CPT(R)] Follow-up Instructions Return in about 3 months (around 5/21/2018). Patient Instructions High Blood Pressure: Care Instructions Your Care Instructions If your blood pressure is usually above 140/90, you have high blood pressure, or hypertension. That means the top number is 140 or higher or the bottom number is 90 or higher, or both. Despite what a lot of people think, high blood pressure usually doesn't cause headaches or make you feel dizzy or lightheaded. It usually has no symptoms.  But it does increase your risk for heart attack, stroke, and kidney or eye damage. The higher your blood pressure, the more your risk increases. Your doctor will give you a goal for your blood pressure. Your goal will be based on your health and your age. An example of a goal is to keep your blood pressure below 140/90. Lifestyle changes, such as eating healthy and being active, are always important to help lower blood pressure. You might also take medicine to reach your blood pressure goal. 
Follow-up care is a key part of your treatment and safety. Be sure to make and go to all appointments, and call your doctor if you are having problems. It's also a good idea to know your test results and keep a list of the medicines you take. How can you care for yourself at home? Medical treatment · If you stop taking your medicine, your blood pressure will go back up. You may take one or more types of medicine to lower your blood pressure. Be safe with medicines. Take your medicine exactly as prescribed. Call your doctor if you think you are having a problem with your medicine. · Talk to your doctor before you start taking aspirin every day. Aspirin can help certain people lower their risk of a heart attack or stroke. But taking aspirin isn't right for everyone, because it can cause serious bleeding. · See your doctor regularly. You may need to see the doctor more often at first or until your blood pressure comes down. · If you are taking blood pressure medicine, talk to your doctor before you take decongestants or anti-inflammatory medicine, such as ibuprofen. Some of these medicines can raise blood pressure. · Learn how to check your blood pressure at home. Lifestyle changes · Stay at a healthy weight. This is especially important if you put on weight around the waist. Losing even 10 pounds can help you lower your blood pressure. · If your doctor recommends it, get more exercise. Walking is a good choice. Bit by bit, increase the amount you walk every day.  Try for at least 30 minutes on most days of the week. You also may want to swim, bike, or do other activities. · Avoid or limit alcohol. Talk to your doctor about whether you can drink any alcohol. · Try to limit how much sodium you eat to less than 2,300 milligrams (mg) a day. Your doctor may ask you to try to eat less than 1,500 mg a day. · Eat plenty of fruits (such as bananas and oranges), vegetables, legumes, whole grains, and low-fat dairy products. · Lower the amount of saturated fat in your diet. Saturated fat is found in animal products such as milk, cheese, and meat. Limiting these foods may help you lose weight and also lower your risk for heart disease. · Do not smoke. Smoking increases your risk for heart attack and stroke. If you need help quitting, talk to your doctor about stop-smoking programs and medicines. These can increase your chances of quitting for good. When should you call for help? Call 911 anytime you think you may need emergency care. This may mean having symptoms that suggest that your blood pressure is causing a serious heart or blood vessel problem. Your blood pressure may be over 180/110. ? For example, call 911 if: 
? · You have symptoms of a heart attack. These may include: ¨ Chest pain or pressure, or a strange feeling in the chest. 
¨ Sweating. ¨ Shortness of breath. ¨ Nausea or vomiting. ¨ Pain, pressure, or a strange feeling in the back, neck, jaw, or upper belly or in one or both shoulders or arms. ¨ Lightheadedness or sudden weakness. ¨ A fast or irregular heartbeat. ? · You have symptoms of a stroke. These may include: 
¨ Sudden numbness, tingling, weakness, or loss of movement in your face, arm, or leg, especially on only one side of your body. ¨ Sudden vision changes. ¨ Sudden trouble speaking. ¨ Sudden confusion or trouble understanding simple statements. ¨ Sudden problems with walking or balance. ¨ A sudden, severe headache that is different from past headaches. ? · You have severe back or belly pain. ?Do not wait until your blood pressure comes down on its own. Get help right away. ?Call your doctor now or seek immediate care if: 
? · Your blood pressure is much higher than normal (such as 180/110 or higher), but you don't have symptoms. ? · You think high blood pressure is causing symptoms, such as: ¨ Severe headache. ¨ Blurry vision. ? Watch closely for changes in your health, and be sure to contact your doctor if: 
? · Your blood pressure measures 140/90 or higher at least 2 times. That means the top number is 140 or higher or the bottom number is 90 or higher, or both. ? · You think you may be having side effects from your blood pressure medicine. ? · Your blood pressure is usually normal, but it goes above normal at least 2 times. Where can you learn more? Go to http://ren-peggy.info/. Enter B185 in the search box to learn more about \"High Blood Pressure: Care Instructions. \" Current as of: September 21, 2016 Content Version: 11.4 © 0240-0472 [employer]. Care instructions adapted under license by [employer] (which disclaims liability or warranty for this information). If you have questions about a medical condition or this instruction, always ask your healthcare professional. [address] any warranty or liability for your use of this information. Introducing [facility]! Ulises Peterson introduces CompleteSet patient portal. Now you can access parts of your medical record, email your doctor's office, and request medication refills online. 1. In your internet browser, go to [email or web address] 2. Click on the First Time User? Click Here link in the Sign In box. You will see the New Member Sign Up page. 3. Enter your CompleteSet Access Code exactly as it appears below.  You will not need to use this code after youve completed the sign-up process. If you do not sign up before the expiration date, you must request a new code. · AdStack Access Code: [email or web address] Expires: 5/22/2018  9:27 AM 
 
4. Enter the last four digits of your Social Security Number (xxxx) and Date of Birth (mm/dd/yyyy) as indicated and click Submit. You will be taken to the next sign-up page. 5. Create a AdStack ID. This will be your AdStack login ID and cannot be changed, so think of one that is secure and easy to remember. 6. Create a AdStack password. You can change your password at any time. 7. Enter your Password Reset Question and Answer. This can be used at a later time if you forget your password. 8. Enter your e-mail address. You will receive e-mail notification when new information is available in [address]. 9. Click Sign Up. You can now view and download portions of your medical record. 10. Click the Download Summary menu link to download a portable copy of your medical information. If you have questions, please visit the Frequently Asked Questions section of the [employer] website. Remember, AdStack is NOT to be used for urgent needs. For medical emergencies, dial 911. Now available from your iPhone and Android! Please provide this summary of care documentation to your next provider. Your [facility] care clinician [facility]. If you have any questions after today's visit, please call [phone].

## 2018-02-21 NOTE — PROGRESS NOTES
1. Have you been to the ER, urgent care clinic since your last visit? Hospitalized since your last visit?    no    2. Have you seen or consulted any other health care providers outside of the [address] [place] [address] since your last visit? Include any pap smears or colon screening. Yes, pcp    3. Since your last visit, have you had any of the following symptoms? Swelling in legs and feet, feeling tired no energy    4. Have you had any blood work, X-rays or cardiac testing?      yes, pcp            5.  Where do you normally have your labs drawn?   pcp    6. Do you need any refills today?    yes

## 2018-02-21 NOTE — PROGRESS NOTES
HISTORY OF PRESENT ILLNESS  Dax Palacios is a 77 y.o. male. CHF   The history is provided by the patient. This is a chronic problem. The problem occurs daily. The problem has not changed since onset. Associated symptoms include shortness of breath. Pertinent negatives include no chest pain. Hypertension   The history is provided by the patient. This is a chronic problem. The current episode started more than 1 week ago. The problem occurs every several days. The problem has not changed since onset. Associated symptoms include shortness of breath. Pertinent negatives include no chest pain. Palpitations    The history is provided by the patient. This is a chronic problem. The current episode started more than 1 week ago. The problem has not changed since onset. The problem occurs every several days. Associated symptoms include shortness of breath. Pertinent negatives include no chest pain. Risk factors include dyslipidemia, diabetes mellitus, hypertension and male gender. His past medical history is significant for hypertension and atrial fibrillation. Shortness of Breath   The history is provided by the patient. This is a chronic problem. The problem occurs intermittently. The current episode started more than 1 week ago. The problem has not changed since onset. Associated symptoms include leg swelling. Pertinent negatives include no ear pain, no neck pain, no wheezing, no chest pain and no rash. Review of Systems   Constitutional: Negative for chills and weight loss. HENT: Negative for ear pain and hearing loss. Eyes: Negative for blurred vision. Respiratory: Positive for shortness of breath. Negative for wheezing and stridor. Cardiovascular: Positive for palpitations and leg swelling. Negative for chest pain. Gastrointestinal: Negative for heartburn. Musculoskeletal: Negative for myalgias and neck pain. Skin: Negative for rash.    Neurological: Negative for tingling, tremors, focal weakness and loss of consciousness. Psychiatric/Behavioral: Negative for depression and suicidal ideas. No family history on file. Past Medical History:   Diagnosis Date    Arthritis     Cardiomegaly     Diabetes ([address]     Essential hypertension, benign     Hyperchloremia     Hypercholesteremia     Hypercholesteremia     Hypertension     Hypertriglyceridemia     Kidney stone     Nonspecific abnormal electrocardiogram (ECG) (EKG)     JUNCTIONAL RHYTHM    Nonspecific abnormal electrocardiogram (ECG) (EKG)     JUNCTIONAL RHYTHM     Other and unspecified angina pectoris 10/17/2014    exertional angina stable better now     Pre-operative cardiovascular examination     Shortness of breath 10/17/2014    exertional hcvd     Type II or unspecified type diabetes mellitus without mention of complication, not stated as uncontrolled     Type II or unspecified type diabetes mellitus without mention of complication, not stated as uncontrolled        Past Surgical History:   Procedure Laterality Date    HX COLECTOMY      HX HERNIA REPAIR      HX ORTHOPAEDIC  5-8-2012    right knee replacement    HX POLYPECTOMY      HX TONSILLECTOMY         Social History   Substance Use Topics    Smoking status: Never Smoker    Smokeless tobacco: Never Used    Alcohol use No       Allergies   Allergen Reactions    Aleve [Naproxen Sodium] Other (comments)     bleeding    Nsaids (Non-Steroidal Anti-Inflammatory Drug) Other (comments)     Risk for gastric erosions       Outpatient Prescriptions Marked as Taking for the 2/21/18 encounter (Office Visit) with Yareli Kirkland MD   Medication Sig Dispense Refill    mupirocin (BACTROBAN) 2 % ointment Apply  to affected area daily.  metoprolol tartrate (LOPRESSOR) 100 mg IR tablet Take 100 mg by mouth daily.  quinapril (ACCUPRIL) 20 mg tablet Take 1 Tab by mouth daily. 90 Tab 3    ELIQUIS 5 mg tablet   0    levETIRAcetam (KEPPRA) 750 mg tablet 750 mg.  glucose blood VI test strips (ASCENSIA AUTODISC VI, ONE TOUCH ULTRA TEST VI) strip 100 Each.  aspirin 81 mg chewable tablet 81 mg.      lisinopril-hydroCHLOROthiazide (PRINZIDE, ZESTORETIC) 20-12.5 mg per tablet   0    apixaban (ELIQUIS) 2.5 mg tablet Take one tablet by mouth twice daily for 2 weeks. 28 Tab 0    tamsulosin (FLOMAX) 0.4 mg capsule Take 1 Cap by mouth daily. 90 Cap 3    finasteride (PROSCAR) 5 mg tablet Take 1 Tab by mouth daily. 90 Tab 3    omeprazole (PRILOSEC) 20 mg capsule Take 20 mg by mouth daily.  pioglitazone (ACTOS) 30 mg tablet Take 30 mg by mouth daily.  multivitamins-minerals-lutein (CENTRUM SILVER) Tab Take 1 Tab by mouth.  glimepiride (AMARYL) 4 mg tablet Take 4 mg by mouth every morning. 1/2 tablet twice a day       latanoprost (XALATAN) 0.005 % ophthalmic solution Administer 1 Drop to both eyes daily.  metFORMIN (GLUCOPHAGE) 1,000 mg tablet Take 1,000 mg by mouth two (2) times daily (with meals).  pravastatin (PRAVACHOL) 80 mg tablet Take 40 mg by mouth daily. Visit Vitals    BP (!) 150/94    Pulse 85    Ht 5' 10\" (1.778 m)    Wt 118.4 kg (261 lb)    BMI 37.45 kg/m2     Physical Exam   Constitutional: He is oriented to person, place, and time. He appears well-developed and well-nourished. No distress. HENT:   Head: Atraumatic. Mouth/Throat: No oropharyngeal exudate. Eyes: Conjunctivae are normal. No scleral icterus. Neck: Normal range of motion. Neck supple. No JVD present. No tracheal deviation present. No thyromegaly present. Cardiovascular: Normal rate. An irregularly irregular rhythm present. Exam reveals no gallop. No murmur heard. Pulses:       Dorsalis pedis pulses are 1+ on the right side, and 1+ on the left side. Posterior tibial pulses are 1+ on the right side, and 1+ on the left side. Pulmonary/Chest: Effort normal and breath sounds normal. No stridor. No respiratory distress.  He has no wheezes. He has no rales. He exhibits no tenderness. Abdominal: Soft. There is no tenderness. There is no rebound and no guarding. Musculoskeletal: Normal range of motion. He exhibits edema. He exhibits no tenderness. Lymphadenopathy:     He has no cervical adenopathy. Neurological: He is alert and oriented to person, place, and time. He exhibits normal muscle tone. Skin: Skin is warm. He is not diaphoretic. Psychiatric: He has a normal mood and affect. His behavior is normal.     ekg atrial fibrillation with no acute st-t changes. ECHO 1/2016:    VERY TECHNICALLY DIFFICULT STUDY. NORMAL LEFT VENTRICULAR SYSTOLIC FUNCTION WITH AN ESTIMATED EJECTION FRACTION OF 55%. NORMAL DIASTOLIC FUNCTION. DILATED LEFT ATRIUM. NO HEMODYNAMICALLY SIGNIFICANT VALVULAR PATHOLOGY. NO EVIDENCE OF PULMONARY HYPERTENSION DUE TO LACK OF TR JET. NO PREVIOUS REPORT FOR COMPARISON. ASSESSMENT and PLAN    ICD-10-CM ICD-9-CM    1. chronic atrial fibrillation I48.91 427.31 AMB POC EKG ROUTINE W/ 12 LEADS, INTER & REP   2. Essential hypertension I10 401.9 quinapril (ACCUPRIL) 20 mg tablet   3. Type 2 diabetes mellitus without complication, unspecified long term insulin use status (HCC) E11.9 250.00    4. Subdural hematoma (HCC) I62.00 432.1    5. Gastrointestinal hemorrhage, unspecified gastrointestinal hemorrhage type K92.2 578.9      Orders Placed This Encounter    AMB POC EKG ROUTINE W/ 12 LEADS, INTER & REP     Order Specific Question:   Reason for Exam:     Answer:   htn    quinapril (ACCUPRIL) 20 mg tablet     Sig: Take 1 Tab by mouth daily. Dispense:  90 Tab     Refill:  3     Follow-up Disposition:  Return in about 3 months (around 5/21/2018). reviewed diet, exercise and weight control  cardiovascular risk and specific lipid/LDL goals reviewed  use of aspirin to prevent MI and TIA's discussed. 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   After discussing with neurosurgeon and GI- we resumed eliquis  His BP remains high, will add quinapril 20mg in addition to lisinopril/hctz.   Follow up in 3 months  Advised salt restriction

## 2018-02-21 NOTE — PATIENT INSTRUCTIONS
High Blood Pressure: Care Instructions  Your Care Instructions    If your blood pressure is usually above 140/90, you have high blood pressure, or hypertension. That means the top number is 140 or higher or the bottom number is 90 or higher, or both. Despite what a lot of people think, high blood pressure usually doesn't cause headaches or make you feel dizzy or lightheaded. It usually has no symptoms. But it does increase your risk for heart attack, stroke, and kidney or eye damage. The higher your blood pressure, the more your risk increases. Your doctor will give you a goal for your blood pressure. Your goal will be based on your health and your age. An example of a goal is to keep your blood pressure below 140/90. Lifestyle changes, such as eating healthy and being active, are always important to help lower blood pressure. You might also take medicine to reach your blood pressure goal.  Follow-up care is a key part of your treatment and safety. Be sure to make and go to all appointments, and call your doctor if you are having problems. It's also a good idea to know your test results and keep a list of the medicines you take. How can you care for yourself at home? Medical treatment  · If you stop taking your medicine, your blood pressure will go back up. You may take one or more types of medicine to lower your blood pressure. Be safe with medicines. Take your medicine exactly as prescribed. Call your doctor if you think you are having a problem with your medicine. · Talk to your doctor before you start taking aspirin every day. Aspirin can help certain people lower their risk of a heart attack or stroke. But taking aspirin isn't right for everyone, because it can cause serious bleeding. · See your doctor regularly. You may need to see the doctor more often at first or until your blood pressure comes down.   · If you are taking blood pressure medicine, talk to your doctor before you take decongestants or anti-inflammatory medicine, such as ibuprofen. Some of these medicines can raise blood pressure. · Learn how to check your blood pressure at home. Lifestyle changes  · Stay at a healthy weight. This is especially important if you put on weight around the waist. Losing even 10 pounds can help you lower your blood pressure. · If your doctor recommends it, get more exercise. Walking is a good choice. Bit by bit, increase the amount you walk every day. Try for at least 30 minutes on most days of the week. You also may want to swim, bike, or do other activities. · Avoid or limit alcohol. Talk to your doctor about whether you can drink any alcohol. · Try to limit how much sodium you eat to less than 2,300 milligrams (mg) a day. Your doctor may ask you to try to eat less than 1,500 mg a day. · Eat plenty of fruits (such as bananas and oranges), vegetables, legumes, whole grains, and low-fat dairy products. · Lower the amount of saturated fat in your diet. Saturated fat is found in animal products such as milk, cheese, and meat. Limiting these foods may help you lose weight and also lower your risk for heart disease. · Do not smoke. Smoking increases your risk for heart attack and stroke. If you need help quitting, talk to your doctor about stop-smoking programs and medicines. These can increase your chances of quitting for good. When should you call for help? Call 911 anytime you think you may need emergency care. This may mean having symptoms that suggest that your blood pressure is causing a serious heart or blood vessel problem. Your blood pressure may be over 180/110. ? For example, call 911 if:  ? · You have symptoms of a heart attack. These may include:  ¨ Chest pain or pressure, or a strange feeling in the chest.  ¨ Sweating. ¨ Shortness of breath. ¨ Nausea or vomiting.   ¨ Pain, pressure, or a strange feeling in the back, neck, jaw, or upper belly or in one or both shoulders or arms.  ¨ Lightheadedness or sudden weakness. ¨ A fast or irregular heartbeat. ? · You have symptoms of a stroke. These may include:  ¨ Sudden numbness, tingling, weakness, or loss of movement in your face, arm, or leg, especially on only one side of your body. ¨ Sudden vision changes. ¨ Sudden trouble speaking. ¨ Sudden confusion or trouble understanding simple statements. ¨ Sudden problems with walking or balance. ¨ A sudden, severe headache that is different from past headaches. ? · You have severe back or belly pain. ?Do not wait until your blood pressure comes down on its own. Get help right away. ?Call your doctor now or seek immediate care if:  ? · Your blood pressure is much higher than normal (such as 180/110 or higher), but you don't have symptoms. ? · You think high blood pressure is causing symptoms, such as:  ¨ Severe headache. ¨ Blurry vision. ? Watch closely for changes in your health, and be sure to contact your doctor if:  ? · Your blood pressure measures 140/90 or higher at least 2 times. That means the top number is 140 or higher or the bottom number is 90 or higher, or both. ? · You think you may be having side effects from your blood pressure medicine. ? · Your blood pressure is usually normal, but it goes above normal at least 2 times. Where can you learn more? Go to http://ren-peggy.info/. Enter U697 in the search box to learn more about \"High Blood Pressure: Care Instructions. \"  Current as of: September 21, 2016  Content Version: 11.4  © 9620-4433 [employer]. Care instructions adapted under license by [employer] (which disclaims liability or warranty for this information). If you have questions about a medical condition or this instruction, always ask your healthcare professional. [place] [address] any warranty or liability for your use of this information.

## 2018-05-16 ENCOUNTER — OFFICE VISIT (OUTPATIENT)
Dept: CARDIOLOGY CLINIC | Age: 78
End: 2018-05-16

## 2018-05-16 VITALS
HEART RATE: 78 BPM | BODY MASS INDEX: 36.88 KG/M2 | DIASTOLIC BLOOD PRESSURE: 98 MMHG | SYSTOLIC BLOOD PRESSURE: 170 MMHG | WEIGHT: 257 LBS | OXYGEN SATURATION: 92 %

## 2018-05-16 DIAGNOSIS — I50.32 CHRONIC DIASTOLIC CONGESTIVE HEART FAILURE (HCC): Primary | ICD-10-CM

## 2018-05-16 DIAGNOSIS — E78.00 HYPERCHOLESTEREMIA: ICD-10-CM

## 2018-05-16 DIAGNOSIS — I10 ESSENTIAL HYPERTENSION, BENIGN: ICD-10-CM

## 2018-05-16 DIAGNOSIS — S06.5XAA SUBDURAL HEMATOMA: ICD-10-CM

## 2018-05-16 DIAGNOSIS — I48.20 CHRONIC ATRIAL FIBRILLATION (HCC): ICD-10-CM

## 2018-05-16 DIAGNOSIS — E11.9 TYPE 2 DIABETES MELLITUS WITHOUT COMPLICATION, UNSPECIFIED WHETHER LONG TERM INSULIN USE (HCC): ICD-10-CM

## 2018-05-16 RX ORDER — FUROSEMIDE 40 MG/1
40 TABLET ORAL 2 TIMES DAILY
Qty: 60 TAB | Refills: 0 | Status: SHIPPED | OUTPATIENT
Start: 2018-05-16 | End: 2019-01-04 | Stop reason: SDUPTHER

## 2018-05-16 NOTE — PROGRESS NOTES
1. Have you been to the ER, urgent care clinic since your last visit? Hospitalized since your last visit? No    2. Have you seen or consulted any other health care providers outside of the [address] [place] [address] since your last visit? Include any pap smears or colon screening. Yes Where: PCP/Urologist     3. Since your last visit, have you had any of the following symptoms? shortness of breath. 4.  Have you had any blood work, X-rays or cardiac testing? Yes Where: PCP Office Reason for visit: Labs            5.  Where do you normally have your labs drawn? PCP Office    6. Do you need any refills today?    No

## 2018-05-16 NOTE — MR AVS SNAPSHOT
[address] [facility] 
 
 
 [address] [facility] 
[phone] Patient: Xena Fairchild MRN: [id number] Visit Information Date & Time Provider Department Dept. Phone Encounter #  
 5/16/2018 12:30 PM Sy Mayes MD [facility]  Follow-up Instructions Return in about 1 week (around 5/23/2018). Your Appointments 5/23/2018 10:00 AM  
Follow Up with Sy Mayes MD  
[facility] ([facility]) Appt Note: 3 month follow up  
 [address] [facility] [address]  
  
   
 [address] [address] [place] [address] Upcoming Health Maintenance Date Due HEMOGLOBIN A1C Q6M 1940 FOOT EXAM Q1 2/14/1949 MICROALBUMIN Q1 2/14/1949 EYE EXAM RETINAL OR DILATED Q1 2/14/1949 DTaP/Tdap/Td series (1 - Tdap) 2/14/1960 ZOSTER VACCINE AGE 60> 12/14/1998 GLAUCOMA SCREENING Q2Y 2/14/2004 Pneumococcal 65+ Low/Medium Risk (1 of 2 - PCV13) 2/14/2004 LIPID PANEL Q1 1/8/2016 MEDICARE YEARLY EXAM 3/14/2018 Influenza Age 5 to Adult 8/1/2018 Allergies as of 5/16/2018  Review Complete On: 5/16/2018 By: Guillermo Diaz Severity Noted Reaction Type Reactions Aleve [Naproxen Sodium]  03/13/2015    Other (comments) bleeding Nsaids (Non-steroidal Anti-inflammatory Drug)  01/23/2016    Other (comments) Risk for gastric erosions Current Immunizations  Never Reviewed No immunizations on file. Not reviewed this visit You Were Diagnosed With   
  
 Codes Comments Chronic diastolic congestive heart failure (HCC)    -  Primary ICD-10-CM: I50.32 
ICD-9-CM: 428.32, 428.0 NYHA class III- decompensated Essential hypertension, benign     ICD-10-CM: I10 
ICD-9-CM: 401.1 Hypercholesteremia     ICD-10-CM: E78.00 ICD-9-CM: 272.0 Chronic atrial fibrillation (HCC)     ICD-10-CM: B01.4 ICD-9-CM: 427.31 Type 2 diabetes mellitus without complication, unspecified whether long term insulin use (HCC)     ICD-10-CM: E11.9 ICD-9-CM: 250.00 Subdural hematoma (HCC)     ICD-10-CM: I62.00 ICD-9-CM: 432.1 Vitals BP Pulse Weight(growth percentile) SpO2 BMI Smoking Status (!) 170/98 78 257 lb (116.6 kg) 92% 36.88 kg/m2 Never Smoker BMI and BSA Data Body Mass Index Body Surface Area  
 36.88 kg/m 2 2.4 m 2 Preferred Pharmacy Pharmacy Name Phone [facility], [address] [phone] Your Updated Medication List  
  
   
This list is accurate as of 5/16/18 12:36 PM.  Always use your most recent med list.  
  
  
  
  
 ACTOS 30 mg tablet Generic drug:  pioglitazone Take 30 mg by mouth daily. aspirin 81 mg chewable tablet 81 mg. CENTRUM SILVER Tab tablet Generic drug:  multivitamins-minerals-lutein Take 1 Tab by mouth. ELIQUIS 5 mg tablet Generic drug:  apixaban  
two (2) times a day. finasteride 5 mg tablet Commonly known as:  PROSCAR Take 1 Tab by mouth daily. furosemide 40 mg tablet Commonly known as:  LASIX Take 1 Tab by mouth two (2) times a day. glimepiride 4 mg tablet Commonly known as:  AMARYL Take 4 mg by mouth every morning. 1/2 tablet twice a day  
  
 glucose blood VI test strips strip Commonly known as:  ASCENSIA AUTODISC VI, ONE TOUCH ULTRA TEST VI  
100 Each.  
  
 latanoprost 0.005 % ophthalmic solution Commonly known as:  Urvashi Many Administer 1 Drop to both eyes daily. levETIRAcetam 750 mg tablet Commonly known as:  KEPPRA  
750 mg.  
  
 lisinopril-hydroCHLOROthiazide 20-12.5 mg per tablet Commonly known as:  PRINZIDE, ZESTORETIC  
  
 metFORMIN 1,000 mg tablet Commonly known as:  GLUCOPHAGE Take 1,000 mg by mouth two (2) times daily (with meals). metoprolol tartrate 100 mg IR tablet Commonly known as:  LOPRESSOR Take 100 mg by mouth daily. mirabegron ER 50 mg ER tablet Commonly known as:  MYRBETRIQ Take 1 Tab by mouth daily. mupirocin 2 % ointment Commonly known as:  [employer] Apply  to affected area daily. pravastatin 80 mg tablet Commonly known as:  PRAVACHOL Take 40 mg by mouth daily. PriLOSEC 20 mg capsule Generic drug:  omeprazole Take 20 mg by mouth daily. quinapril 20 mg tablet Commonly known as:  ACCUPRIL Take 1 Tab by mouth daily. sildenafil citrate 100 mg tablet Commonly known as:  VIAGRA Take 1 Tab by mouth daily as needed. tamsulosin 0.4 mg capsule Commonly known as:  FLOMAX Take 1 Cap by mouth daily. Prescriptions Sent to Pharmacy Refills  
 furosemide (LASIX) 40 mg tablet 0 Sig: Take 1 Tab by mouth two (2) times a day. Class: Normal  
 Pharmacy: [address], [facility] [address]  #: [phone] Route: Oral  
  
Follow-up Instructions Return in about 1 week (around 5/23/2018). To-Do List   
 05/21/2018 Lab:  MAGNESIUM   
  
 05/21/2018 Lab:  METABOLIC PANEL, BASIC Introducing [facility] HE[facility]! [facility] introduces [email or web address] patient portal. Now you can access parts of your medical record, email your doctor's office, and request medication refills online. 1. In your internet browser, go to [email or web address] 2. Click on the First Time User? Click Here link in the Sign In box. You will see the New Member Sign Up page. 3. Enter your [email or web address] Access Code exactly as it appears below. You will not need to use this code after youve completed the sign-up process. If you do not sign up before the expiration date, you must request a new code. · [email or web address] Access Code: [email or web address] Expires: 5/22/2018 10:27 AM 
 
 4. Enter the last four digits of your Social Security Number (xxxx) and Date of Birth (mm/dd/yyyy) as indicated and click Submit. You will be taken to the next sign-up page. 5. Create a GameGround ID. This will be your Game[employer]ound login ID and cannot be changed, so think of one that is secure and easy to remember. 6. Create a GameGround password. You can change your password at any time. 7. Enter your Password Reset Question and Answer. This can be used at a later time if you forget your password. 8. Enter your e-mail address. You will receive e-mail notification when new information is available in [address] 9. Click Sign Up. You can now view and download portions of your medical record. 10. Click the Download Summary menu link to download a portable copy of your medical information. If you have questions, please visit the Frequently Asked Questions section of the GameGround website. Remember, GameGround is NOT to be used for urgent needs. For medical emergencies, dial 911. Now available from your iPhone and Android! Please provide this summary of care documentation to your next provider. Your primary care clinician is listed as Whitney Dennis. If you have any questions after today's visit, please call [phone].

## 2018-05-16 NOTE — PROGRESS NOTES
HISTORY OF PRESENT ILLNESS  Dax Ayon is a 77 y.o. male. Shortness of Breath   The history is provided by the patient. This is a chronic problem. The problem occurs frequently. The current episode started more than 1 week ago. The problem has been gradually worsening. Associated symptoms include leg swelling. Pertinent negatives include no ear pain, no neck pain, no wheezing and no rash. Associated medical issues include heart failure. CHF   The history is provided by the patient. This is a chronic problem. The problem occurs daily. The problem has been gradually worsening. Associated symptoms include shortness of breath. Palpitations    The history is provided by the patient. This is a chronic problem. The current episode started more than 1 week ago. The problem has not changed since onset. The problem occurs every several days. Associated symptoms include shortness of breath. Risk factors include dyslipidemia, diabetes mellitus, hypertension and male gender. His past medical history is significant for atrial fibrillation. Review of Systems   Constitutional: Negative for chills and weight loss. HENT: Negative for ear pain and hearing loss. Eyes: Negative for blurred vision. Respiratory: Positive for shortness of breath. Negative for wheezing and stridor. Cardiovascular: Positive for palpitations and leg swelling. Gastrointestinal: Negative for heartburn. Musculoskeletal: Negative for myalgias and neck pain. Skin: Negative for rash. Neurological: Negative for tingling, tremors, focal weakness and loss of consciousness. Psychiatric/Behavioral: Negative for depression and suicidal ideas. No family history on file.     Past Medical History:   Diagnosis Date    Arthritis     Cardiomegaly     Diabetes ([address]     Essential hypertension, benign     Hyperchloremia     Hypercholesteremia     Hypercholesteremia     Hypertension     Hypertriglyceridemia     Kidney stone     Nonspecific abnormal electrocardiogram (ECG) (EKG)     JUNCTIONAL RHYTHM    Nonspecific abnormal electrocardiogram (ECG) (EKG)     JUNCTIONAL RHYTHM     Other and unspecified angina pectoris ([address] 10/17/2014    exertional angina stable better now     Pre-operative cardiovascular examination     Shortness of breath 10/17/2014    exertional hcvd     Type II or unspecified type diabetes mellitus without mention of complication, not stated as uncontrolled     Type II or unspecified type diabetes mellitus without mention of complication, not stated as uncontrolled        Past Surgical History:   Procedure Laterality Date    HX COLECTOMY      HX HERNIA REPAIR      HX ORTHOPAEDIC  5-8-2012    right knee replacement    HX POLYPECTOMY      HX TONSILLECTOMY         Social History   Substance Use Topics    Smoking status: Never Smoker    Smokeless tobacco: Never Used    Alcohol use No       Allergies   Allergen Reactions    Aleve [Naproxen Sodium] Other (comments)     bleeding    Nsaids (Non-Steroidal Anti-Inflammatory Drug) Other (comments)     Risk for gastric erosions       Outpatient Prescriptions Marked as Taking for the 5/16/18 encounter (Office Visit) with Víctor Marcos MD   Medication Sig Dispense Refill    furosemide (LASIX) 40 mg tablet Take 1 Tab by mouth two (2) times a day. 60 Tab 0    mupirocin (BACTROBAN) 2 % ointment Apply  to affected area daily.  metoprolol tartrate (LOPRESSOR) 100 mg IR tablet Take 100 mg by mouth daily.  quinapril (ACCUPRIL) 20 mg tablet Take 1 Tab by mouth daily. 90 Tab 3    mirabegron ER (MYRBETRIQ) 50 mg ER tablet Take 1 Tab by mouth daily. 90 Tab 1    sildenafil citrate (VIAGRA) 100 mg tablet Take 1 Tab by mouth daily as needed. 6 Tab 6    ELIQUIS 5 mg tablet two (2) times a day.  0    levETIRAcetam (KEPPRA) 750 mg tablet 750 mg.      glucose blood VI test strips (ASCENSIA AUTODISC VI, ONE TOUCH ULTRA TEST VI) strip 100 Each.       lisinopril-hydroCHLOROthiazide (PRINZIDE, ZESTORETIC) 20-12.5 mg per tablet   0    tamsulosin (FLOMAX) 0.4 mg capsule Take 1 Cap by mouth daily. 90 Cap 3    finasteride (PROSCAR) 5 mg tablet Take 1 Tab by mouth daily. 90 Tab 3    omeprazole (PRILOSEC) 20 mg capsule Take 20 mg by mouth daily.  pioglitazone (ACTOS) 30 mg tablet Take 30 mg by mouth daily.  multivitamins-minerals-lutein (CENTRUM SILVER) Tab Take 1 Tab by mouth.  glimepiride (AMARYL) 4 mg tablet Take 4 mg by mouth every morning. 1/2 tablet twice a day       latanoprost (XALATAN) 0.005 % ophthalmic solution Administer 1 Drop to both eyes daily.  metFORMIN (GLUCOPHAGE) 1,000 mg tablet Take 1,000 mg by mouth two (2) times daily (with meals).  pravastatin (PRAVACHOL) 80 mg tablet Take 40 mg by mouth daily. Visit Vitals    BP (!) 170/98    Pulse 78    Wt 116.6 kg (257 lb)    SpO2 92%    BMI 36.88 kg/m2     Physical Exam   Constitutional: He is oriented to person, place, and time. He appears well-developed and well-nourished. No distress. HENT:   Head: Atraumatic. Mouth/Throat: No oropharyngeal exudate. Eyes: Conjunctivae are normal. No scleral icterus. Neck: Normal range of motion. Neck supple. No JVD present. No tracheal deviation present. No thyromegaly present. Cardiovascular: Normal rate. An irregularly irregular rhythm present. Exam reveals no gallop. No murmur heard. Pulses:       Dorsalis pedis pulses are 1+ on the right side, and 1+ on the left side. Posterior tibial pulses are 1+ on the right side, and 1+ on the left side. Pulmonary/Chest: Effort normal and breath sounds normal. No stridor. No respiratory distress. He has no wheezes. He has no rales. He exhibits no tenderness. Abdominal: Soft. There is no tenderness. There is no rebound and no guarding. Musculoskeletal: Normal range of motion. He exhibits edema (2+ ble). He exhibits no tenderness.    Lymphadenopathy:     He has no cervical adenopathy. Neurological: He is alert and oriented to person, place, and time. He exhibits normal muscle tone. Skin: Skin is warm. He is not diaphoretic. Psychiatric: He has a normal mood and affect. His behavior is normal.     ekg atrial fibrillation with no acute st-t changes. ECHO 1/2016:    VERY TECHNICALLY DIFFICULT STUDY. NORMAL LEFT VENTRICULAR SYSTOLIC FUNCTION WITH AN ESTIMATED EJECTION FRACTION OF 55%. NORMAL DIASTOLIC FUNCTION. DILATED LEFT ATRIUM. NO HEMODYNAMICALLY SIGNIFICANT VALVULAR PATHOLOGY. NO EVIDENCE OF PULMONARY HYPERTENSION DUE TO LACK OF TR JET. NO PREVIOUS REPORT FOR COMPARISON. ASSESSMENT and PLAN    ICD-10-CM ICD-9-CM    1. Chronic diastolic congestive heart failure (HCC) I50.32 428.32 MAGNESIUM     958.5 METABOLIC PANEL, BASIC    NYHA class III- decompensated   2. Essential hypertension, benign I10 401.1    3. Hypercholesteremia E78.00 272.0    4. Chronic atrial fibrillation (HCC) I48.2 427.31    5. Type 2 diabetes mellitus without complication, unspecified whether long term insulin use ([facility]) E11.9 250.00    6. Subdural hematoma ([facility]) I62.00 432.1      Orders Placed This Encounter    MAGNESIUM     Standing Status:   Future     Standing Expiration Date:   8/87/8489    METABOLIC PANEL, BASIC     Standing Status:   Future     Standing Expiration Date:   5/17/2019    furosemide (LASIX) 40 mg tablet     Sig: Take 1 Tab by mouth two (2) times a day. Dispense:  60 Tab     Refill:  0     Follow-up Disposition:  Return in about 1 week (around 5/23/2018). reviewed diet, exercise and weight control  cardiovascular risk and specific lipid/LDL goals reviewed  use of aspirin to prevent MI and TIA's discussed. Patient seen for worsening sob-- has CHF- NYHA class III. Will discontinue lotensin/hctz and increase quinapril to 40mg daily  Start lasix 40mg po bid for 5 days and then 40mg daily. ,  Will obtain bmp/mg and f/u in 1 week.

## 2018-05-21 LAB
ANION GAP SERPL CALC-SCNC: 14.5 MMOL/L
BUN SERPL-MCNC: 22 MG/DL (ref 6–22)
CALCIUM SERPL-MCNC: 9.1 MG/DL (ref 8.4–10.4)
CHLORIDE SERPL-SCNC: 101 MMOL/L (ref 98–110)
CO2 SERPL-SCNC: 28 MMOL/L (ref 20–32)
CREAT SERPL-MCNC: 0.8 MG/DL (ref 0.8–1.6)
GFRAA, 66117: >60
GFRNA, 66118: >60
GLUCOSE SERPL-MCNC: 197 MG/DL (ref 70–99)
MAGNESIUM SERPL-MCNC: 1.6 MG/DL (ref 1.6–2.5)
POTASSIUM SERPL-SCNC: 3.9 MMOL/L (ref 3.5–5.5)
SODIUM SERPL-SCNC: 143 MMOL/L (ref 133–145)

## 2018-05-23 ENCOUNTER — OFFICE VISIT (OUTPATIENT)
Dept: CARDIOLOGY CLINIC | Age: 78
End: 2018-05-23

## 2018-05-23 VITALS
HEIGHT: 70 IN | DIASTOLIC BLOOD PRESSURE: 82 MMHG | WEIGHT: 245 LBS | BODY MASS INDEX: 35.07 KG/M2 | SYSTOLIC BLOOD PRESSURE: 156 MMHG | HEART RATE: 83 BPM

## 2018-05-23 DIAGNOSIS — E11.9 TYPE 2 DIABETES MELLITUS WITHOUT COMPLICATION, UNSPECIFIED WHETHER LONG TERM INSULIN USE (HCC): ICD-10-CM

## 2018-05-23 DIAGNOSIS — I50.32 CHRONIC DIASTOLIC CONGESTIVE HEART FAILURE (HCC): ICD-10-CM

## 2018-05-23 DIAGNOSIS — I48.20 CHRONIC ATRIAL FIBRILLATION (HCC): ICD-10-CM

## 2018-05-23 DIAGNOSIS — I10 ESSENTIAL HYPERTENSION, BENIGN: Primary | ICD-10-CM

## 2018-05-23 RX ORDER — POTASSIUM CHLORIDE 20 MEQ/1
20 TABLET, EXTENDED RELEASE ORAL DAILY
Qty: 30 TAB | Refills: 6 | Status: SHIPPED | OUTPATIENT
Start: 2018-05-23 | End: 2018-12-26 | Stop reason: SDUPTHER

## 2018-05-23 RX ORDER — LANOLIN ALCOHOL/MO/W.PET/CERES
400 CREAM (GRAM) TOPICAL 2 TIMES DAILY
Qty: 14 TAB | Refills: 0 | Status: ON HOLD | OUTPATIENT
Start: 2018-05-23 | End: 2021-10-11

## 2018-05-23 NOTE — PROGRESS NOTES
HISTORY OF PRESENT ILLNESS  Dax Ayon is a 77 y.o. male. Shortness of Breath   The history is provided by the patient. This is a chronic problem. The problem occurs intermittently. The current episode started more than 1 week ago. The problem has been gradually improving. Associated symptoms include leg swelling. Pertinent negatives include no ear pain, no neck pain, no wheezing and no rash. Associated medical issues include heart failure. CHF   The history is provided by the patient. This is a chronic problem. The problem occurs daily. The problem has been gradually improving. Associated symptoms include shortness of breath. Palpitations    The history is provided by the patient. This is a chronic problem. The current episode started more than 1 week ago. The problem has not changed since onset. The problem occurs every several days. Associated symptoms include shortness of breath. Risk factors include dyslipidemia, diabetes mellitus, hypertension and male gender. His past medical history is significant for atrial fibrillation. Review of Systems   Constitutional: Negative for chills and weight loss. HENT: Negative for ear pain and hearing loss. Eyes: Negative for blurred vision. Respiratory: Positive for shortness of breath. Negative for wheezing and stridor. Cardiovascular: Positive for palpitations and leg swelling. Gastrointestinal: Negative for heartburn. Musculoskeletal: Negative for myalgias and neck pain. Skin: Negative for rash. Neurological: Negative for tingling, tremors, focal weakness and loss of consciousness. Psychiatric/Behavioral: Negative for depression and suicidal ideas. No family history on file.     Past Medical History:   Diagnosis Date    Arthritis     Cardiomegaly     Diabetes ([address]     Essential hypertension, benign     Hyperchloremia     Hypercholesteremia     Hypercholesteremia     Hypertension     Hypertriglyceridemia     Kidney stone  Nonspecific abnormal electrocardiogram (ECG) (EKG)     JUNCTIONAL RHYTHM    Nonspecific abnormal electrocardiogram (ECG) (EKG)     JUNCTIONAL RHYTHM     Other and unspecified angina pectoris 10/17/2014    exertional angina stable better now     Pre-operative cardiovascular examination     Shortness of breath 10/17/2014    exertional hcvd     Type II or unspecified type diabetes mellitus without mention of complication, not stated as uncontrolled     Type II or unspecified type diabetes mellitus without mention of complication, not stated as uncontrolled        Past Surgical History:   Procedure Laterality Date    HX COLECTOMY      HX HERNIA REPAIR      HX ORTHOPAEDIC  5-8-2012    right knee replacement    HX POLYPECTOMY      HX TONSILLECTOMY         Social History   Substance Use Topics    Smoking status: Never Smoker    Smokeless tobacco: Never Used    Alcohol use No       Allergies   Allergen Reactions    Aleve [Naproxen Sodium] Other (comments)     bleeding    Nsaids (Non-Steroidal Anti-Inflammatory Drug) Other (comments)     Risk for gastric erosions       Outpatient Prescriptions Marked as Taking for the 5/23/18 encounter (Office Visit) with Lisbeth Monroe MD   Medication Sig Dispense Refill    magnesium oxide (MAG-OX) 400 mg tablet Take 1 Tab by mouth two (2) times a day. 14 Tab 0    potassium chloride (K-DUR, KLOR-CON) 20 mEq tablet Take 1 Tab by mouth daily. 30 Tab 6    furosemide (LASIX) 40 mg tablet Take 1 Tab by mouth two (2) times a day. 60 Tab 0    mupirocin (BACTROBAN) 2 % ointment Apply  to affected area daily.  metoprolol tartrate (LOPRESSOR) 100 mg IR tablet Take 50 mg by mouth daily.  quinapril (ACCUPRIL) 20 mg tablet Take 1 Tab by mouth daily. (Patient taking differently: Take 0.5 mg by mouth daily.) 90 Tab 3    sildenafil citrate (VIAGRA) 100 mg tablet Take 1 Tab by mouth daily as needed.  6 Tab 6    ELIQUIS 5 mg tablet two (2) times a day.  0    levETIRAcetam (KEPPRA) 750 mg tablet 750 mg.      glucose blood VI test strips (ASCENSIA AUTODISC VI, ONE TOUCH ULTRA TEST VI) strip 100 Each.  tamsulosin (FLOMAX) 0.4 mg capsule Take 1 Cap by mouth daily. 90 Cap 3    finasteride (PROSCAR) 5 mg tablet Take 1 Tab by mouth daily. 90 Tab 3    omeprazole (PRILOSEC) 20 mg capsule Take 20 mg by mouth daily.  pioglitazone (ACTOS) 30 mg tablet Take 30 mg by mouth daily.  multivitamins-minerals-lutein (CENTRUM SILVER) Tab Take 1 Tab by mouth.  glimepiride (AMARYL) 4 mg tablet Take 4 mg by mouth every morning. 1/2 tablet twice a day       latanoprost (XALATAN) 0.005 % ophthalmic solution Administer 1 Drop to both eyes daily.  metFORMIN (GLUCOPHAGE) 1,000 mg tablet Take 1,000 mg by mouth two (2) times daily (with meals).  pravastatin (PRAVACHOL) 80 mg tablet Take 40 mg by mouth daily. Visit Vitals    /82    Pulse 83    Ht 5' 10\" (1.778 m)    Wt 111.1 kg (245 lb)    BMI 35.15 kg/m2     Physical Exam   Constitutional: He is oriented to person, place, and time. He appears well-developed and well-nourished. No distress. HENT:   Head: Atraumatic. Mouth/Throat: No oropharyngeal exudate. Eyes: Conjunctivae are normal. No scleral icterus. Neck: Normal range of motion. Neck supple. No JVD present. No tracheal deviation present. No thyromegaly present. Cardiovascular: Normal rate. An irregularly irregular rhythm present. Exam reveals no gallop. No murmur heard. Pulses:       Dorsalis pedis pulses are 1+ on the right side, and 1+ on the left side. Posterior tibial pulses are 1+ on the right side, and 1+ on the left side. Pulmonary/Chest: Effort normal and breath sounds normal. No stridor. No respiratory distress. He has no wheezes. He has no rales. He exhibits no tenderness. Abdominal: Soft. There is no tenderness. There is no rebound and no guarding. Musculoskeletal: Normal range of motion.  He exhibits edema (1+ ble). He exhibits no tenderness. Lymphadenopathy:     He has no cervical adenopathy. Neurological: He is alert and oriented to person, place, and time. He exhibits normal muscle tone. Skin: Skin is warm. He is not diaphoretic. Psychiatric: He has a normal mood and affect. His behavior is normal.     ekg atrial fibrillation with no acute st-t changes. ECHO 1/2016:    VERY TECHNICALLY DIFFICULT STUDY. NORMAL LEFT VENTRICULAR SYSTOLIC FUNCTION WITH AN ESTIMATED EJECTION FRACTION OF 55%. NORMAL DIASTOLIC FUNCTION. DILATED LEFT ATRIUM. NO HEMODYNAMICALLY SIGNIFICANT VALVULAR PATHOLOGY. NO EVIDENCE OF PULMONARY HYPERTENSION DUE TO LACK OF TR JET. NO PREVIOUS REPORT FOR COMPARISON. ASSESSMENT and PLAN    ICD-10-CM ICD-9-CM    1. Essential hypertension, benign I10 401.1    2. Chronic atrial fibrillation (HCC) I48.2 427.31    3. Chronic diastolic congestive heart failure (HCC) I50.32 428.32      428.0    4. Type 2 diabetes mellitus without complication, unspecified whether long term insulin use (HCC) E11.9 250.00      Orders Placed This Encounter    magnesium oxide (MAG-OX) 400 mg tablet     Sig: Take 1 Tab by mouth two (2) times a day. Dispense:  14 Tab     Refill:  0    potassium chloride (K-DUR, KLOR-CON) 20 mEq tablet     Sig: Take 1 Tab by mouth daily. Dispense:  30 Tab     Refill:  6     Follow-up Disposition:  Return in about 1 month (around 6/23/2018). reviewed diet, exercise and weight control  cardiovascular risk and specific lipid/LDL goals reviewed  use of aspirin to prevent MI and TIA's discussed.     Patient seen for worsening sob-- has CHF- NYHA class III- responded well to lasix-- will continue 40mg bid for 5 additional days and then switch to 40mg daily  Will supplement mg and start daily KCL  F/u in 1 month

## 2018-05-23 NOTE — PATIENT INSTRUCTIONS
High Blood Pressure: Care Instructions  Your Care Instructions    If your blood pressure is usually above 140/90, you have high blood pressure, or hypertension. That means the top number is 140 or higher or the bottom number is 90 or higher, or both. Despite what a lot of people think, high blood pressure usually doesn't cause headaches or make you feel dizzy or lightheaded. It usually has no symptoms. But it does increase your risk for heart attack, stroke, and kidney or eye damage. The higher your blood pressure, the more your risk increases. Your doctor will give you a goal for your blood pressure. Your goal will be based on your health and your age. An example of a goal is to keep your blood pressure below 140/90. Lifestyle changes, such as eating healthy and being active, are always important to help lower blood pressure. You might also take medicine to reach your blood pressure goal.  Follow-up care is a key part of your treatment and safety. Be sure to make and go to all appointments, and call your doctor if you are having problems. It's also a good idea to know your test results and keep a list of the medicines you take. How can you care for yourself at home? Medical treatment  · If you stop taking your medicine, your blood pressure will go back up. You may take one or more types of medicine to lower your blood pressure. Be safe with medicines. Take your medicine exactly as prescribed. Call your doctor if you think you are having a problem with your medicine. · Talk to your doctor before you start taking aspirin every day. Aspirin can help certain people lower their risk of a heart attack or stroke. But taking aspirin isn't right for everyone, because it can cause serious bleeding. · See your doctor regularly. You may need to see the doctor more often at first or until your blood pressure comes down.   · If you are taking blood pressure medicine, talk to your doctor before you take decongestants or anti-inflammatory medicine, such as ibuprofen. Some of these medicines can raise blood pressure. · Learn how to check your blood pressure at home. Lifestyle changes  · Stay at a healthy weight. This is especially important if you put on weight around the waist. Losing even 10 pounds can help you lower your blood pressure. · If your doctor recommends it, get more exercise. Walking is a good choice. Bit by bit, increase the amount you walk every day. Try for at least 30 minutes on most days of the week. You also may want to swim, bike, or do other activities. · Avoid or limit alcohol. Talk to your doctor about whether you can drink any alcohol. · Try to limit how much sodium you eat to less than 2,300 milligrams (mg) a day. Your doctor may ask you to try to eat less than 1,500 mg a day. · Eat plenty of fruits (such as bananas and oranges), vegetables, legumes, whole grains, and low-fat dairy products. · Lower the amount of saturated fat in your diet. Saturated fat is found in animal products such as milk, cheese, and meat. Limiting these foods may help you lose weight and also lower your risk for heart disease. · Do not smoke. Smoking increases your risk for heart attack and stroke. If you need help quitting, talk to your doctor about stop-smoking programs and medicines. These can increase your chances of quitting for good. When should you call for help? Call 911 anytime you think you may need emergency care. This may mean having symptoms that suggest that your blood pressure is causing a serious heart or blood vessel problem. Your blood pressure may be over 180/110. ? For example, call 911 if:  ? · You have symptoms of a heart attack. These may include:  ¨ Chest pain or pressure, or a strange feeling in the chest.  ¨ Sweating. ¨ Shortness of breath. ¨ Nausea or vomiting.   ¨ Pain, pressure, or a strange feeling in the back, neck, jaw, or upper belly or in one or both shoulders or arms.  ¨ Lightheadedness or sudden weakness. ¨ A fast or irregular heartbeat. ? · You have symptoms of a stroke. These may include:  ¨ Sudden numbness, tingling, weakness, or loss of movement in your face, arm, or leg, especially on only one side of your body. ¨ Sudden vision changes. ¨ Sudden trouble speaking. ¨ Sudden confusion or trouble understanding simple statements. ¨ Sudden problems with walking or balance. ¨ A sudden, severe headache that is different from past headaches. ? · You have severe back or belly pain. ?Do not wait until your blood pressure comes down on its own. Get help right away. ?Call your doctor now or seek immediate care if:  ? · Your blood pressure is much higher than normal (such as 180/110 or higher), but you don't have symptoms. ? · You think high blood pressure is causing symptoms, such as:  ¨ Severe headache. ¨ Blurry vision. ? Watch closely for changes in your health, and be sure to contact your doctor if:  ? · Your blood pressure measures 140/90 or higher at least 2 times. That means the top number is 140 or higher or the bottom number is 90 or higher, or both. ? · You think you may be having side effects from your blood pressure medicine. ? · Your blood pressure is usually normal, but it goes above normal at least 2 times. Where can you learn more? Go to http://ern-peggy.info/. Enter N276 in the search box to learn more about \"High Blood Pressure: Care Instructions. \"  Current as of: September 21, 2016  Content Version: 11.4  © 2193-8067 [employer]. Care instructions adapted under license by [employer] (which disclaims liability or warranty for this information). If you have questions about a medical condition or this instruction, always ask your healthcare professional. [place] [address] any warranty or liability for your use of this information.

## 2018-05-23 NOTE — MR AVS SNAPSHOT
[address] [facility] 
 
 
 [address] [facility] 
[phone] Patient: Maria Del Carmen Chung MRN: [id number] WMN:[id number] Visit Information Date & Time Provider Department Dept. Phone Encounter #  
 5/23/2018 10:00 AM Satya Benítez MD [facility] [phone] Follow-up Instructions Return in about 1 month (around 6/23/2018). Your Appointments 6/27/2018 10:30 AM  
ESTABLISHED PATIENT with Satya Benítez MD  
[facility] ([address] [facility]) Appt Note: 1 month [address] [facility] [address]  
  
   
 [address] Jalen Crawford [id number] Upcoming Health Maintenance Date Due HEMOGLOBIN A1C Q6M 1940 FOOT EXAM Q1 2/14/1949 MICROALBUMIN Q1 2/14/1949 EYE EXAM RETINAL OR DILATED Q1 2/14/1949 DTaP/Tdap/Td series (1 - Tdap) 2/14/1960 ZOSTER VACCINE AGE 60> 12/14/1998 GLAUCOMA SCREENING Q2Y 2/14/2004 Pneumococcal 65+ Low/Medium Risk (1 of 2 - PCV13) 2/14/2004 LIPID PANEL Q1 1/8/2016 MEDICARE YEARLY EXAM 3/14/2018 Influenza Age 5 to Adult 8/1/2018 Allergies as of 5/23/2018  Review Complete On: 5/23/2018 By: Tushar Huang Severity Noted Reaction Type Reactions Aleve [Naproxen Sodium]  03/13/2015    Other (comments) bleeding Nsaids (Non-steroidal Anti-inflammatory Drug)  01/23/2016    Other (comments) Risk for gastric erosions Current Immunizations  Never Reviewed No immunizations on file. Not reviewed this visit You Were Diagnosed With   
  
 Codes Comments Essential hypertension, benign    -  Primary ICD-10-CM: I10 
ICD-9-CM: 655. 1 Chronic atrial fibrillation (HCC)     ICD-10-CM: L46.5 ICD-9-CM: 427.31 Chronic diastolic congestive heart failure (HCC)     ICD-10-CM: I50.32 
ICD-9-CM: 428.32, 428.0 Type 2 diabetes mellitus without complication, unspecified whether long term insulin use (HCC)     ICD-10-CM: E11.9 ICD-9-CM: 250.00 Vitals BP Pulse Height(growth percentile) Weight(growth percentile) BMI Smoking Status 156/82 83 5' 10\" (1.778 m) 245 lb (111.1 kg) 35.15 kg/m2 Never Smoker Vitals History BMI and BSA Data Body Mass Index Body Surface Area  
 35.15 kg/m 2 2.34 m 2 Preferred Pharmacy Pharmacy Name Phone [facility], [address] [phone] Your Updated Medication List  
  
   
This list is accurate as of 5/23/18 10:39 AM.  Always use your most recent med list.  
  
  
  
  
 ACTOS 30 mg tablet Generic drug:  pioglitazone Take 30 mg by mouth daily. aspirin 81 mg chewable tablet 81 mg. CENTRUM SILVER Tab tablet Generic drug:  multivitamins-minerals-lutein Take 1 Tab by mouth. ELIQUIS 5 mg tablet Generic drug:  apixaban  
two (2) times a day. finasteride 5 mg tablet Commonly known as:  PROSCAR Take 1 Tab by mouth daily. furosemide 40 mg tablet Commonly known as:  LASIX Take 1 Tab by mouth two (2) times a day. glimepiride 4 mg tablet Commonly known as:  AMARYL Take 4 mg by mouth every morning. 1/2 tablet twice a day  
  
 glucose blood VI test strips strip Commonly known as:  ASCENSIA AUTODISC VI, ONE TOUCH ULTRA TEST VI  
100 Each.  
  
 latanoprost 0.005 % ophthalmic solution Commonly known as:  [place] Administer 1 Drop to both eyes daily. levETIRAcetam 750 mg tablet Commonly known as:  KEPPRA  
750 mg.  
  
 lisinopril-hydroCHLOROthiazide 20-12.5 mg per tablet Commonly known as:  PRINZIDE, ZESTORETIC  
  
 magnesium oxide 400 mg tablet Commonly known as:  MAG-OX Take 1 Tab by mouth two (2) times a day. metFORMIN 1,000 mg tablet Commonly known as:  GLUCOPHAGE Take 1,000 mg by mouth two (2) times daily (with meals). metoprolol tartrate 100 mg IR tablet Commonly known as:  LOPRESSOR Take 50 mg by mouth daily. mirabegron ER 50 mg ER tablet Commonly known as:  MYRBETRIQ Take 1 Tab by mouth daily. mupirocin 2 % ointment Commonly known as:  [facility] Apply  to affected area daily. potassium chloride 20 mEq tablet Commonly known as:  K-DUR, KLOR-CON Take 1 Tab by mouth daily. pravastatin 80 mg tablet Commonly known as:  PRAVACHOL Take 40 mg by mouth daily. PriLOSEC 20 mg capsule Generic drug:  omeprazole Take 20 mg by mouth daily. quinapril 20 mg tablet Commonly known as:  ACCUPRIL Take 1 Tab by mouth daily. sildenafil citrate 100 mg tablet Commonly known as:  VIAGRA Take 1 Tab by mouth daily as needed. tamsulosin 0.4 mg capsule Commonly known as:  FLOMAX Take 1 Cap by mouth daily. Prescriptions Sent to Pharmacy Refills  
 magnesium oxide (MAG-OX) 400 mg tablet 0 Sig: Take 1 Tab by mouth two (2) times a day. Class: Normal  
 Pharmacy: [id number] [address] [place] [address],[address], [address] Ph #: [phone] Route: Oral  
 potassium chloride (K-DUR, KLOR-CON) 20 mEq tablet 6 Sig: Take 1 Tab by mouth daily. Class: Normal  
 Pharmacy: [id number] [address] [place] [address],[address], [address] Ph #: [phone] Route: Oral  
  
Follow-up Instructions Return in about 1 month (around 6/23/2018). Patient Instructions High Blood Pressure: Care Instructions Your Care Instructions If your blood pressure is usually above 140/90, you have high blood pressure, or hypertension. That means the top number is 140 or higher or the bottom number is 90 or higher, or both. Despite what a lot of people think, high blood pressure usually doesn't cause headaches or make you feel dizzy or lightheaded. It usually has no symptoms.  But it does increase your risk for heart attack, stroke, and kidney or eye damage. The higher your blood pressure, the more your risk increases. Your doctor will give you a goal for your blood pressure. Your goal will be based on your health and your age. An example of a goal is to keep your blood pressure below 140/90. Lifestyle changes, such as eating healthy and being active, are always important to help lower blood pressure. You might also take medicine to reach your blood pressure goal. 
Follow-up care is a key part of your treatment and safety. Be sure to make and go to all appointments, and call your doctor if you are having problems. It's also a good idea to know your test results and keep a list of the medicines you take. How can you care for yourself at home? Medical treatment · If you stop taking your medicine, your blood pressure will go back up. You may take one or more types of medicine to lower your blood pressure. Be safe with medicines. Take your medicine exactly as prescribed. Call your doctor if you think you are having a problem with your medicine. · Talk to your doctor before you start taking aspirin every day. Aspirin can help certain people lower their risk of a heart attack or stroke. But taking aspirin isn't right for everyone, because it can cause serious bleeding. · See your doctor regularly. You may need to see the doctor more often at first or until your blood pressure comes down. · If you are taking blood pressure medicine, talk to your doctor before you take decongestants or anti-inflammatory medicine, such as ibuprofen. Some of these medicines can raise blood pressure. · Learn how to check your blood pressure at home. Lifestyle changes · Stay at a healthy weight. This is especially important if you put on weight around the waist. Losing even 10 pounds can help you lower your blood pressure. · If your doctor recommends it, get more exercise. Walking is a good choice. Bit by bit, increase the amount you walk every day.  Try for at least 30 minutes on most days of the week. You also may want to swim, bike, or do other activities. · Avoid or limit alcohol. Talk to your doctor about whether you can drink any alcohol. · Try to limit how much sodium you eat to less than 2,300 milligrams (mg) a day. Your doctor may ask you to try to eat less than 1,500 mg a day. · Eat plenty of fruits (such as bananas and oranges), vegetables, legumes, whole grains, and low-fat dairy products. · Lower the amount of saturated fat in your diet. Saturated fat is found in animal products such as milk, cheese, and meat. Limiting these foods may help you lose weight and also lower your risk for heart disease. · Do not smoke. Smoking increases your risk for heart attack and stroke. If you need help quitting, talk to your doctor about stop-smoking programs and medicines. These can increase your chances of quitting for good. When should you call for help? Call 911 anytime you think you may need emergency care. This may mean having symptoms that suggest that your blood pressure is causing a serious heart or blood vessel problem. Your blood pressure may be over 180/110. ? For example, call 911 if: 
? · You have symptoms of a heart attack. These may include: ¨ Chest pain or pressure, or a strange feeling in the chest. 
¨ Sweating. ¨ Shortness of breath. ¨ Nausea or vomiting. ¨ Pain, pressure, or a strange feeling in the back, neck, jaw, or upper belly or in one or both shoulders or arms. ¨ Lightheadedness or sudden weakness. ¨ A fast or irregular heartbeat. ? · You have symptoms of a stroke. These may include: 
¨ Sudden numbness, tingling, weakness, or loss of movement in your face, arm, or leg, especially on only one side of your body. ¨ Sudden vision changes. ¨ Sudden trouble speaking. ¨ Sudden confusion or trouble understanding simple statements. ¨ Sudden problems with walking or balance. ¨ A sudden, severe headache that is different from past headaches. ? · You have severe back or belly pain. ?Do not wait until your blood pressure comes down on its own. Get help right away. ?Call your doctor now or seek immediate care if: 
? · Your blood pressure is much higher than normal (such as 180/110 or higher), but you don't have symptoms. ? · You think high blood pressure is causing symptoms, such as: ¨ Severe headache. ¨ Blurry vision. ? Watch closely for changes in your health, and be sure to contact your doctor if: 
? · Your blood pressure measures 140/90 or higher at least 2 times. That means the top number is 140 or higher or the bottom number is 90 or higher, or both. ? · You think you may be having side effects from your blood pressure medicine. ? · Your blood pressure is usually normal, but it goes above normal at least 2 times. Where can you learn more? Go to http://ren-peggy.info/. Enter K761 in the search box to learn more about \"High Blood Pressure: Care Instructions. \" Current as of: September 21, 2016 Content Version: 11.4 © 3846-2004 [employer]. Care instructions adapted under license by [employer] (which disclaims liability or warranty for this information). If you have questions about a medical condition or this instruction, always ask your healthcare professional. [address] any warranty or liability for your use of this information. Introducing [facility]! Lainey Nolasco introduces [employer] patient portal. Now you can access parts of your medical record, email your doctor's office, and request medication refills online. 1. In your internet browser, go to [email or web address] 2. Click on the First Time User? Click Here link in the Sign In box. You will see the New Member Sign Up page. 3. Enter your [employer] Access Code exactly as it appears below.  You will not need to use this code after youve completed the sign-up process. If you do not sign up before the expiration date, you must request a new code. · [employer] Access Code: [email or web address] Expires: 8/21/2018 10:39 AM 
 
4. Enter the last four digits of your Social Security Number (xxxx) and Date of Birth (mm/dd/yyyy) as indicated and click Submit. You will be taken to the next sign-up page. 5. Create a [employer] ID. This will be your [employer] login ID and cannot be changed, so think of one that is secure and easy to remember. 6. Create a [employer] password. You can change your password at any time. 7. Enter your Password Reset Question and Answer. This can be used at a later time if you forget your password. 8. Enter your e-mail address. You will receive e-mail notification when new information is available in [address] 9. Click Sign Up. You can now view and download portions of your medical record. 10. Click the Download Summary menu link to download a portable copy of your medical information. If you have questions, please visit the Frequently Asked Questions section of the [employer] website. Remember, [employer] is NOT to be used for urgent needs. For medical emergencies, dial 911. Now available from your iPhone and Android! Please provide this summary of care documentation to your next provider. Your primary care clinician is listed as Lenord Cooks. If you have any questions after today's visit, please call [phone].

## 2018-05-23 NOTE — PROGRESS NOTES
1. Have you been to the ER, urgent care clinic since your last visit? Hospitalized since your last visit? No    2. Have you seen or consulted any other health care providers outside of the [address] [place] [address] since your last visit? Include any pap smears or colon screening. No     3. Since your last visit, have you had any of the following symptoms? shortness of breath, dizziness and swelling in legs/arms. 4.  Have you had any blood work, X-rays or cardiac testing? Yes Where: [facility] Reason for visit: Labs            5.  Where do you normally have your labs drawn? [facility]    6. Do you need any refills today?    No

## 2018-06-27 ENCOUNTER — OFFICE VISIT (OUTPATIENT)
Dept: CARDIOLOGY CLINIC | Age: 78
End: 2018-06-27

## 2018-06-27 VITALS
HEART RATE: 72 BPM | WEIGHT: 236 LBS | HEIGHT: 70 IN | BODY MASS INDEX: 33.79 KG/M2 | SYSTOLIC BLOOD PRESSURE: 107 MMHG | DIASTOLIC BLOOD PRESSURE: 60 MMHG

## 2018-06-27 DIAGNOSIS — I10 ESSENTIAL HYPERTENSION: ICD-10-CM

## 2018-06-27 DIAGNOSIS — I50.32 CHRONIC DIASTOLIC CONGESTIVE HEART FAILURE (HCC): Primary | ICD-10-CM

## 2018-06-27 DIAGNOSIS — I48.20 CHRONIC ATRIAL FIBRILLATION (HCC): ICD-10-CM

## 2018-06-27 DIAGNOSIS — K92.2 GASTROINTESTINAL HEMORRHAGE, UNSPECIFIED GASTROINTESTINAL HEMORRHAGE TYPE: ICD-10-CM

## 2018-06-27 DIAGNOSIS — E78.00 HYPERCHOLESTEREMIA: ICD-10-CM

## 2018-06-27 DIAGNOSIS — E11.9 TYPE 2 DIABETES MELLITUS WITHOUT COMPLICATION, UNSPECIFIED WHETHER LONG TERM INSULIN USE (HCC): ICD-10-CM

## 2018-06-27 NOTE — PROGRESS NOTES
1. Have you been to the ER, urgent care clinic since your last visit? Hospitalized since your last visit? No    2. Have you seen or consulted any other health care providers outside of the [address] [place] [address] since your last visit? Include any pap smears or colon screening. No     3. Since your last visit, have you had any of the following symptoms? .         4. Have you had any blood work, X-rays or cardiac testing? Yes Where: [facility] Reason for visit: Ultrasound Arteries            5.  Where do you normally have your labs drawn? Dr Babita Regalado    6. Do you need any refills today?    No

## 2018-06-27 NOTE — MR AVS SNAPSHOT
[address] [facility] 
 
 
 [address] [facility] 
[phone] Patient: Devyn Sinha MRN: [id number] HNL:[id number] Visit Information Date & Time Provider Department Dept. Phone Encounter #  
 6/27/2018 10:30 AM Bar Kendrick MD [facility] [place] [id number] Follow-up Instructions Return in about 4 months (around 10/27/2018). Follow-up and Disposition History Upcoming Health Maintenance Date Due HEMOGLOBIN A1C Q6M 1939 FOOT EXAM Q1 2/14/1949 MICROALBUMIN Q1 2/14/1949 EYE EXAM RETINAL OR DILATED Q1 2/14/1949 DTaP/Tdap/Td series (1 - Tdap) 2/14/1960 ZOSTER VACCINE AGE 60> 12/14/1998 GLAUCOMA SCREENING Q2Y 2/14/2004 Pneumococcal 65+ Low/Medium Risk (1 of 2 - PCV13) 2/14/2004 LIPID PANEL Q1 1/8/2016 MEDICARE YEARLY EXAM 3/14/2018 Influenza Age 5 to Adult 8/1/2018 Allergies as of 6/27/2018  Review Complete On: 6/27/2018 By: Bar Kendrick MD  
  
 Severity Noted Reaction Type Reactions Aleve [Naproxen Sodium]  03/13/2015    Other (comments) bleeding Nsaids (Non-steroidal Anti-inflammatory Drug)  01/23/2016    Other (comments) Risk for gastric erosions Current Immunizations  Never Reviewed No immunizations on file. Not reviewed this visit You Were Diagnosed With   
  
 Codes Comments Chronic diastolic congestive heart failure (HCC)    -  Primary ICD-10-CM: I50.32 
ICD-9-CM: 428.32, 428.0 NYHA class II Chronic atrial fibrillation (HCC)     ICD-10-CM: C38.3 ICD-9-CM: 427.31 Essential hypertension     ICD-10-CM: I10 
ICD-9-CM: 401.9 Hypercholesteremia     ICD-10-CM: E78.00 ICD-9-CM: 272.0 Gastrointestinal hemorrhage, unspecified gastrointestinal hemorrhage type     ICD-10-CM: K92.2 ICD-9-CM: 578.9 Type 2 diabetes mellitus without complication, unspecified whether long term insulin use (HCC)     ICD-10-CM: E11.9 ICD-9-CM: 250.00 Vitals BP Pulse Height(growth percentile) Weight(growth percentile) BMI Smoking Status 107/60 72 5' 10\" (1.778 m) 236 lb (107 kg) 33.86 kg/m2 Never Smoker Vitals History BMI and BSA Data Body Mass Index Body Surface Area  
 33.86 kg/m 2 2.3 m 2 Preferred Pharmacy Pharmacy Name Phone [facility], [facility] [phone] Your Updated Medication List  
  
   
This list is accurate as of 6/27/18 11:32 AM.  Always use your most recent med list.  
  
  
  
  
 ACTOS 30 mg tablet Generic drug:  pioglitazone Take 30 mg by mouth daily. aspirin 81 mg chewable tablet 81 mg. CENTRUM SILVER Tab tablet Generic drug:  multivitamins-minerals-lutein Take 1 Tab by mouth. ELIQUIS 5 mg tablet Generic drug:  apixaban  
two (2) times a day. finasteride 5 mg tablet Commonly known as:  PROSCAR Take 1 Tab by mouth daily. furosemide 40 mg tablet Commonly known as:  LASIX Take 1 Tab by mouth two (2) times a day. glimepiride 4 mg tablet Commonly known as:  AMARYL Take 4 mg by mouth every morning. 1/2 tablet twice a day  
  
 glucose blood VI test strips strip Commonly known as:  ASCENSIA AUTODISC VI, ONE TOUCH ULTRA TEST VI  
100 Each.  
  
 latanoprost 0.005 % ophthalmic solution Commonly known as:  German Nim Administer 1 Drop to both eyes daily. levETIRAcetam 750 mg tablet Commonly known as:  KEPPRA  
750 mg.  
  
 lisinopril-hydroCHLOROthiazide 20-12.5 mg per tablet Commonly known as:  PRINZIDE, ZESTORETIC  
  
 magnesium oxide 400 mg tablet Commonly known as:  MAG-OX Take 1 Tab by mouth two (2) times a day. metFORMIN 1,000 mg tablet Commonly known as:  GLUCOPHAGE Take 1,000 mg by mouth two (2) times daily (with meals). metoprolol tartrate 100 mg IR tablet Commonly known as:  LOPRESSOR Take 50 mg by mouth daily. mirabegron ER 50 mg ER tablet Commonly known as:  MYRBETRIQ Take 1 Tab by mouth daily. mupirocin 2 % ointment Commonly known as:  [facility] Apply  to affected area daily. potassium chloride 20 mEq tablet Commonly known as:  K-DUR, KLOR-CON Take 1 Tab by mouth daily. pravastatin 80 mg tablet Commonly known as:  PRAVACHOL Take 40 mg by mouth daily. PriLOSEC 20 mg capsule Generic drug:  omeprazole Take 20 mg by mouth daily. quinapril 20 mg tablet Commonly known as:  ACCUPRIL Take 1 Tab by mouth daily. sildenafil citrate 100 mg tablet Commonly known as:  VIAGRA Take 1 Tab by mouth daily as needed. tamsulosin 0.4 mg capsule Commonly known as:  FLOMAX Take 1 Cap by mouth daily. Follow-up Instructions Return in about 4 months (around 10/27/2018). Patient Instructions High Blood Pressure: Care Instructions Your Care Instructions If your blood pressure is usually above 140/90, you have high blood pressure, or hypertension. That means the top number is 140 or higher or the bottom number is 90 or higher, or both. Despite what a lot of people think, high blood pressure usually doesn't cause headaches or make you feel dizzy or lightheaded. It usually has no symptoms. But it does increase your risk for heart attack, stroke, and kidney or eye damage. The higher your blood pressure, the more your risk increases. Your doctor will give you a goal for your blood pressure. Your goal will be based on your health and your age. An example of a goal is to keep your blood pressure below 140/90. Lifestyle changes, such as eating healthy and being active, are always important to help lower blood pressure. You might also take medicine to reach your blood pressure goal. 
Follow-up care is a key part of your treatment and safety.  Be sure to make and go to all appointments, and call your doctor if you are having problems. It's also a good idea to know your test results and keep a list of the medicines you take. How can you care for yourself at home? Medical treatment · If you stop taking your medicine, your blood pressure will go back up. You may take one or more types of medicine to lower your blood pressure. Be safe with medicines. Take your medicine exactly as prescribed. Call your doctor if you think you are having a problem with your medicine. · Talk to your doctor before you start taking aspirin every day. Aspirin can help certain people lower their risk of a heart attack or stroke. But taking aspirin isn't right for everyone, because it can cause serious bleeding. · See your doctor regularly. You may need to see the doctor more often at first or until your blood pressure comes down. · If you are taking blood pressure medicine, talk to your doctor before you take decongestants or anti-inflammatory medicine, such as ibuprofen. Some of these medicines can raise blood pressure. · Learn how to check your blood pressure at home. Lifestyle changes · Stay at a healthy weight. This is especially important if you put on weight around the waist. Losing even 10 pounds can help you lower your blood pressure. · If your doctor recommends it, get more exercise. Walking is a good choice. Bit by bit, increase the amount you walk every day. Try for at least 30 minutes on most days of the week. You also may want to swim, bike, or do other activities. · Avoid or limit alcohol. Talk to your doctor about whether you can drink any alcohol. · Try to limit how much sodium you eat to less than 2,300 milligrams (mg) a day. Your doctor may ask you to try to eat less than 1,500 mg a day. · Eat plenty of fruits (such as bananas and oranges), vegetables, legumes, whole grains, and low-fat dairy products. · Lower the amount of saturated fat in your diet.  Saturated fat is found in animal products such as milk, cheese, and meat. Limiting these foods may help you lose weight and also lower your risk for heart disease. · Do not smoke. Smoking increases your risk for heart attack and stroke. If you need help quitting, talk to your doctor about stop-smoking programs and medicines. These can increase your chances of quitting for good. When should you call for help? Call 911 anytime you think you may need emergency care. This may mean having symptoms that suggest that your blood pressure is causing a serious heart or blood vessel problem. Your blood pressure may be over 180/110. ? For example, call 911 if: 
? · You have symptoms of a heart attack. These may include: ¨ Chest pain or pressure, or a strange feeling in the chest. 
¨ Sweating. ¨ Shortness of breath. ¨ Nausea or vomiting. ¨ Pain, pressure, or a strange feeling in the back, neck, jaw, or upper belly or in one or both shoulders or arms. ¨ Lightheadedness or sudden weakness. ¨ A fast or irregular heartbeat. ? · You have symptoms of a stroke. These may include: 
¨ Sudden numbness, tingling, weakness, or loss of movement in your face, arm, or leg, especially on only one side of your body. ¨ Sudden vision changes. ¨ Sudden trouble speaking. ¨ Sudden confusion or trouble understanding simple statements. ¨ Sudden problems with walking or balance. ¨ A sudden, severe headache that is different from past headaches. ? · You have severe back or belly pain. ?Do not wait until your blood pressure comes down on its own. Get help right away. ?Call your doctor now or seek immediate care if: 
? · Your blood pressure is much higher than normal (such as 180/110 or higher), but you don't have symptoms. ? · You think high blood pressure is causing symptoms, such as: ¨ Severe headache. ¨ Blurry vision. ? Watch closely for changes in your health, and be sure to contact your doctor if: ? · Your blood pressure measures 140/90 or higher at least 2 times. That means the top number is 140 or higher or the bottom number is 90 or higher, or both. ? · You think you may be having side effects from your blood pressure medicine. ? · Your blood pressure is usually normal, but it goes above normal at least 2 times. Where can you learn more? Go to http://ren-peggy.info/. Enter N883 in the search box to learn more about \"High Blood Pressure: Care Instructions. \" Current as of: September 21, 2016 Content Version: 11.4 © 2475-1961 [employer]. Care instructions adapted under license by [employer] (which disclaims liability or warranty for this information). If you have questions about a medical condition or this instruction, always ask your healthcare professional. [address] any warranty or liability for your use of this information. Patient Instructions History Introducing [facility]! Judi Childers introduces [employer] patient portal. Now you can access parts of your medical record, email your doctor's office, and request medication refills online. 1. In your internet browser, go to [email or web address] 2. Click on the First Time User? Click Here link in the Sign In box. You will see the New Member Sign Up page. 3. Enter your [employer] Access Code exactly as it appears below. You will not need to use this code after youve completed the sign-up process. If you do not sign up before the expiration date, you must request a new code. · [employer] Access Code: [email or web address] Expires: 8/21/2018 10:39 AM 
 
4. Enter the last four digits of your Social Security Number (xxxx) and Date of Birth (mm/dd/yyyy) as indicated and click Submit. You will be taken to the next sign-up page. 5. Create a NemeriX ID.  This will be your [employer]X login ID and cannot be changed, so think of one that is secure and easy to remember. 6. Create a [employer] password. You can change your password at any time. 7. Enter your Password Reset Question and Answer. This can be used at a later time if you forget your password. 8. Enter your e-mail address. You will receive e-mail notification when new information is available in [address] 9. Click Sign Up. You can now view and download portions of your medical record. 10. Click the Download Summary menu link to download a portable copy of your medical information. If you have questions, please visit the Frequently Asked Questions section of the [employer] website. Remember, [employer] is NOT to be used for urgent needs. For medical emergencies, dial 911. Now available from your iPhone and Android! Please provide this summary of care documentation to your next provider. Your primary care clinician is listed as Coby Rico. If you have any questions after today's visit, please call [phone].

## 2018-06-27 NOTE — PATIENT INSTRUCTIONS
High Blood Pressure: Care Instructions  Your Care Instructions    If your blood pressure is usually above 140/90, you have high blood pressure, or hypertension. That means the top number is 140 or higher or the bottom number is 90 or higher, or both. Despite what a lot of people think, high blood pressure usually doesn't cause headaches or make you feel dizzy or lightheaded. It usually has no symptoms. But it does increase your risk for heart attack, stroke, and kidney or eye damage. The higher your blood pressure, the more your risk increases. Your doctor will give you a goal for your blood pressure. Your goal will be based on your health and your age. An example of a goal is to keep your blood pressure below 140/90. Lifestyle changes, such as eating healthy and being active, are always important to help lower blood pressure. You might also take medicine to reach your blood pressure goal.  Follow-up care is a key part of your treatment and safety. Be sure to make and go to all appointments, and call your doctor if you are having problems. It's also a good idea to know your test results and keep a list of the medicines you take. How can you care for yourself at home? Medical treatment  · If you stop taking your medicine, your blood pressure will go back up. You may take one or more types of medicine to lower your blood pressure. Be safe with medicines. Take your medicine exactly as prescribed. Call your doctor if you think you are having a problem with your medicine. · Talk to your doctor before you start taking aspirin every day. Aspirin can help certain people lower their risk of a heart attack or stroke. But taking aspirin isn't right for everyone, because it can cause serious bleeding. · See your doctor regularly. You may need to see the doctor more often at first or until your blood pressure comes down.   · If you are taking blood pressure medicine, talk to your doctor before you take decongestants or anti-inflammatory medicine, such as ibuprofen. Some of these medicines can raise blood pressure. · Learn how to check your blood pressure at home. Lifestyle changes  · Stay at a healthy weight. This is especially important if you put on weight around the waist. Losing even 10 pounds can help you lower your blood pressure. · If your doctor recommends it, get more exercise. Walking is a good choice. Bit by bit, increase the amount you walk every day. Try for at least 30 minutes on most days of the week. You also may want to swim, bike, or do other activities. · Avoid or limit alcohol. Talk to your doctor about whether you can drink any alcohol. · Try to limit how much sodium you eat to less than 2,300 milligrams (mg) a day. Your doctor may ask you to try to eat less than 1,500 mg a day. · Eat plenty of fruits (such as bananas and oranges), vegetables, legumes, whole grains, and low-fat dairy products. · Lower the amount of saturated fat in your diet. Saturated fat is found in animal products such as milk, cheese, and meat. Limiting these foods may help you lose weight and also lower your risk for heart disease. · Do not smoke. Smoking increases your risk for heart attack and stroke. If you need help quitting, talk to your doctor about stop-smoking programs and medicines. These can increase your chances of quitting for good. When should you call for help? Call 911 anytime you think you may need emergency care. This may mean having symptoms that suggest that your blood pressure is causing a serious heart or blood vessel problem. Your blood pressure may be over 180/110. ? For example, call 911 if:  ? · You have symptoms of a heart attack. These may include:  ¨ Chest pain or pressure, or a strange feeling in the chest.  ¨ Sweating. ¨ Shortness of breath. ¨ Nausea or vomiting.   ¨ Pain, pressure, or a strange feeling in the back, neck, jaw, or upper belly or in one or both shoulders or arms.  ¨ Lightheadedness or sudden weakness. ¨ A fast or irregular heartbeat. ? · You have symptoms of a stroke. These may include:  ¨ Sudden numbness, tingling, weakness, or loss of movement in your face, arm, or leg, especially on only one side of your body. ¨ Sudden vision changes. ¨ Sudden trouble speaking. ¨ Sudden confusion or trouble understanding simple statements. ¨ Sudden problems with walking or balance. ¨ A sudden, severe headache that is different from past headaches. ? · You have severe back or belly pain. ?Do not wait until your blood pressure comes down on its own. Get help right away. ?Call your doctor now or seek immediate care if:  ? · Your blood pressure is much higher than normal (such as 180/110 or higher), but you don't have symptoms. ? · You think high blood pressure is causing symptoms, such as:  ¨ Severe headache. ¨ Blurry vision. ? Watch closely for changes in your health, and be sure to contact your doctor if:  ? · Your blood pressure measures 140/90 or higher at least 2 times. That means the top number is 140 or higher or the bottom number is 90 or higher, or both. ? · You think you may be having side effects from your blood pressure medicine. ? · Your blood pressure is usually normal, but it goes above normal at least 2 times. Where can you learn more? Go to http://ren-peggy.info/. Enter L479 in the search box to learn more about \"High Blood Pressure: Care Instructions. \"  Current as of: September 21, 2016  Content Version: 11.4  © 8334-9496 [employer]. Care instructions adapted under license by [employer] (which disclaims liability or warranty for this information). If you have questions about a medical condition or this instruction, always ask your healthcare professional. [place] [address] any warranty or liability for your use of this information.

## 2018-06-27 NOTE — PROGRESS NOTES
HISTORY OF PRESENT ILLNESS  Dax Poe is a 77 y.o. male. Shortness of Breath   The history is provided by the patient. This is a chronic problem. The problem occurs intermittently. The current episode started more than 1 week ago. The problem has been gradually improving. Associated symptoms include leg swelling. Pertinent negatives include no ear pain, no neck pain, no wheezing and no rash. Associated medical issues include heart failure. CHF   The history is provided by the patient. This is a chronic problem. The problem occurs daily. The problem has been gradually improving. Associated symptoms include shortness of breath. Irregular Heart Beat    The history is provided by the patient. This is a chronic problem. The current episode started more than 1 week ago. The problem has not changed since onset. The problem occurs every several days. Associated symptoms include shortness of breath. Risk factors include dyslipidemia, diabetes mellitus, hypertension and male gender. His past medical history is significant for atrial fibrillation. Review of Systems   Constitutional: Negative for chills and weight loss. HENT: Negative for ear pain and hearing loss. Eyes: Negative for blurred vision. Respiratory: Positive for shortness of breath. Negative for wheezing and stridor. Cardiovascular: Positive for palpitations and leg swelling. Gastrointestinal: Negative for heartburn. Musculoskeletal: Negative for myalgias and neck pain. Skin: Negative for rash. Neurological: Negative for tingling, tremors, focal weakness and loss of consciousness. Psychiatric/Behavioral: Negative for depression and suicidal ideas. No family history on file.     Past Medical History:   Diagnosis Date    Arthritis     Cardiomegaly     Diabetes ([address]     Essential hypertension, benign     Hyperchloremia     Hypercholesteremia     Hypercholesteremia     Hypertension     Hypertriglyceridemia     Kidney stone     Nonspecific abnormal electrocardiogram (ECG) (EKG)     JUNCTIONAL RHYTHM    Nonspecific abnormal electrocardiogram (ECG) (EKG)     JUNCTIONAL RHYTHM     Other and unspecified angina pectoris 10/17/2014    exertional angina stable better now     Pre-operative cardiovascular examination     Shortness of breath 10/17/2014    exertional hcvd     Type II or unspecified type diabetes mellitus without mention of complication, not stated as uncontrolled     Type II or unspecified type diabetes mellitus without mention of complication, not stated as uncontrolled        Past Surgical History:   Procedure Laterality Date    HX COLECTOMY      HX HERNIA REPAIR      HX ORTHOPAEDIC  5-8-2012    right knee replacement    HX POLYPECTOMY      HX TONSILLECTOMY         Social History   Substance Use Topics    Smoking status: Never Smoker    Smokeless tobacco: Never Used    Alcohol use No       Allergies   Allergen Reactions    Aleve [Naproxen Sodium] Other (comments)     bleeding    Nsaids (Non-Steroidal Anti-Inflammatory Drug) Other (comments)     Risk for gastric erosions       Outpatient Prescriptions Marked as Taking for the 6/27/18 encounter (Office Visit) with Britton Boas, MD   Medication Sig Dispense Refill    magnesium oxide (MAG-OX) 400 mg tablet Take 1 Tab by mouth two (2) times a day. 14 Tab 0    potassium chloride (K-DUR, KLOR-CON) 20 mEq tablet Take 1 Tab by mouth daily. 30 Tab 6    furosemide (LASIX) 40 mg tablet Take 1 Tab by mouth two (2) times a day. 60 Tab 0    mupirocin (BACTROBAN) 2 % ointment Apply  to affected area daily.  metoprolol tartrate (LOPRESSOR) 100 mg IR tablet Take 50 mg by mouth daily.  quinapril (ACCUPRIL) 20 mg tablet Take 1 Tab by mouth daily. (Patient taking differently: Take 0.5 mg by mouth daily.) 90 Tab 3    mirabegron ER (MYRBETRIQ) 50 mg ER tablet Take 1 Tab by mouth daily.  90 Tab 1    sildenafil citrate (VIAGRA) 100 mg tablet Take 1 Tab by mouth daily as needed. 6 Tab 6    ELIQUIS 5 mg tablet two (2) times a day.  0    levETIRAcetam (KEPPRA) 750 mg tablet 750 mg.      glucose blood VI test strips (ASCENSIA AUTODISC VI, ONE TOUCH ULTRA TEST VI) strip 100 Each.  tamsulosin (FLOMAX) 0.4 mg capsule Take 1 Cap by mouth daily. 90 Cap 3    finasteride (PROSCAR) 5 mg tablet Take 1 Tab by mouth daily. 90 Tab 3    omeprazole (PRILOSEC) 20 mg capsule Take 20 mg by mouth daily.  multivitamins-minerals-lutein (CENTRUM SILVER) Tab Take 1 Tab by mouth.  glimepiride (AMARYL) 4 mg tablet Take 4 mg by mouth every morning. 1/2 tablet twice a day       latanoprost (XALATAN) 0.005 % ophthalmic solution Administer 1 Drop to both eyes daily.  metFORMIN (GLUCOPHAGE) 1,000 mg tablet Take 1,000 mg by mouth two (2) times daily (with meals).  pravastatin (PRAVACHOL) 80 mg tablet Take 40 mg by mouth daily. Visit Vitals    /60    Pulse 72    Ht 5' 10\" (1.778 m)    Wt 107 kg (236 lb)    BMI 33.86 kg/m2     Physical Exam   Constitutional: He is oriented to person, place, and time. He appears well-developed and well-nourished. No distress. HENT:   Head: Atraumatic. Mouth/Throat: No oropharyngeal exudate. Eyes: Conjunctivae are normal. No scleral icterus. Neck: Normal range of motion. Neck supple. No JVD present. No tracheal deviation present. No thyromegaly present. Cardiovascular: Normal rate. An irregularly irregular rhythm present. Exam reveals no gallop. No murmur heard. Pulses:       Dorsalis pedis pulses are 1+ on the right side, and 1+ on the left side. Posterior tibial pulses are 1+ on the right side, and 1+ on the left side. Pulmonary/Chest: Effort normal and breath sounds normal. No stridor. No respiratory distress. He has no wheezes. He has no rales. He exhibits no tenderness. Abdominal: Soft. There is no tenderness. There is no rebound and no guarding.    Musculoskeletal: Normal range of motion. He exhibits no edema or tenderness. Lymphadenopathy:     He has no cervical adenopathy. Neurological: He is alert and oriented to person, place, and time. He exhibits normal muscle tone. Skin: Skin is warm. He is not diaphoretic. Psychiatric: He has a normal mood and affect. His behavior is normal.     ekg atrial fibrillation with no acute st-t changes. ECHO 1/2016:    VERY TECHNICALLY DIFFICULT STUDY. NORMAL LEFT VENTRICULAR SYSTOLIC FUNCTION WITH AN ESTIMATED EJECTION FRACTION OF 55%. NORMAL DIASTOLIC FUNCTION. DILATED LEFT ATRIUM. NO HEMODYNAMICALLY SIGNIFICANT VALVULAR PATHOLOGY. NO EVIDENCE OF PULMONARY HYPERTENSION DUE TO LACK OF TR JET. NO PREVIOUS REPORT FOR COMPARISON. ASSESSMENT and PLAN    ICD-10-CM ICD-9-CM    1. Chronic diastolic congestive heart failure (HCC) I50.32 428.32      428.0     NYHA class II   2. Chronic atrial fibrillation (HCC) I48.2 427.31    3. Essential hypertension I10 401.9    4. Hypercholesteremia E78.00 272.0    5. Gastrointestinal hemorrhage, unspecified gastrointestinal hemorrhage type K92.2 578.9    6. Type 2 diabetes mellitus without complication, unspecified whether long term insulin use (HCC) E11.9 250.00      No orders of the defined types were placed in this encounter. Follow-up Disposition:  Return in about 4 months (around 10/27/2018). reviewed diet, exercise and weight control  cardiovascular risk and specific lipid/LDL goals reviewed  use of aspirin to prevent MI and TIA's discussed. Patient with chronic diastolic CHF- NYHA class II- has lost additional 7-8 lbs since last visit.   Continue current meds  Recommend salt restriction  F/u in 4 months

## 2018-11-07 ENCOUNTER — OFFICE VISIT (OUTPATIENT)
Dept: CARDIOLOGY CLINIC | Age: 78
End: 2018-11-07

## 2018-11-07 VITALS
DIASTOLIC BLOOD PRESSURE: 72 MMHG | HEART RATE: 85 BPM | SYSTOLIC BLOOD PRESSURE: 127 MMHG | BODY MASS INDEX: 35.55 KG/M2 | HEIGHT: 69 IN | WEIGHT: 240 LBS

## 2018-11-07 DIAGNOSIS — I50.32 CHRONIC DIASTOLIC CONGESTIVE HEART FAILURE (HCC): Primary | ICD-10-CM

## 2018-11-07 DIAGNOSIS — E78.00 HYPERCHOLESTEREMIA: ICD-10-CM

## 2018-11-07 DIAGNOSIS — I48.20 CHRONIC ATRIAL FIBRILLATION (HCC): ICD-10-CM

## 2018-11-07 DIAGNOSIS — E11.9 TYPE 2 DIABETES MELLITUS WITHOUT COMPLICATION, UNSPECIFIED WHETHER LONG TERM INSULIN USE (HCC): ICD-10-CM

## 2018-11-07 DIAGNOSIS — I10 ESSENTIAL HYPERTENSION: ICD-10-CM

## 2018-11-07 DIAGNOSIS — K92.2 GASTROINTESTINAL HEMORRHAGE, UNSPECIFIED GASTROINTESTINAL HEMORRHAGE TYPE: ICD-10-CM

## 2018-11-07 PROBLEM — E66.01 SEVERE OBESITY (HCC): Status: ACTIVE | Noted: 2018-11-07

## 2018-11-07 NOTE — PATIENT INSTRUCTIONS
High Blood Pressure: Care Instructions  Your Care Instructions    If your blood pressure is usually above 130/80, you have high blood pressure, or hypertension. That means the top number is 130 or higher or the bottom number is 80 or higher, or both. Despite what a lot of people think, high blood pressure usually doesn't cause headaches or make you feel dizzy or lightheaded. It usually has no symptoms. But it does increase your risk for heart attack, stroke, and kidney or eye damage. The higher your blood pressure, the more your risk increases. Your doctor will give you a goal for your blood pressure. Your goal will be based on your health and your age. Lifestyle changes, such as eating healthy and being active, are always important to help lower blood pressure. You might also take medicine to reach your blood pressure goal.  Follow-up care is a key part of your treatment and safety. Be sure to make and go to all appointments, and call your doctor if you are having problems. It's also a good idea to know your test results and keep a list of the medicines you take. How can you care for yourself at home? Medical treatment  · If you stop taking your medicine, your blood pressure will go back up. You may take one or more types of medicine to lower your blood pressure. Be safe with medicines. Take your medicine exactly as prescribed. Call your doctor if you think you are having a problem with your medicine. · Talk to your doctor before you start taking aspirin every day. Aspirin can help certain people lower their risk of a heart attack or stroke. But taking aspirin isn't right for everyone, because it can cause serious bleeding. · See your doctor regularly. You may need to see the doctor more often at first or until your blood pressure comes down. · If you are taking blood pressure medicine, talk to your doctor before you take decongestants or anti-inflammatory medicine, such as ibuprofen.  Some of these medicines can raise blood pressure. · Learn how to check your blood pressure at home. Lifestyle changes  · Stay at a healthy weight. This is especially important if you put on weight around the waist. Losing even 10 pounds can help you lower your blood pressure. · If your doctor recommends it, get more exercise. Walking is a good choice. Bit by bit, increase the amount you walk every day. Try for at least 30 minutes on most days of the week. You also may want to swim, bike, or do other activities. · Avoid or limit alcohol. Talk to your doctor about whether you can drink any alcohol. · Try to limit how much sodium you eat to less than 2,300 milligrams (mg) a day. Your doctor may ask you to try to eat less than 1,500 mg a day. · Eat plenty of fruits (such as bananas and oranges), vegetables, legumes, whole grains, and low-fat dairy products. · Lower the amount of saturated fat in your diet. Saturated fat is found in animal products such as milk, cheese, and meat. Limiting these foods may help you lose weight and also lower your risk for heart disease. · Do not smoke. Smoking increases your risk for heart attack and stroke. If you need help quitting, talk to your doctor about stop-smoking programs and medicines. These can increase your chances of quitting for good. When should you call for help? Call 911 anytime you think you may need emergency care. This may mean having symptoms that suggest that your blood pressure is causing a serious heart or blood vessel problem. Your blood pressure may be over 180/120.   For example, call 911 if:    · You have symptoms of a heart attack. These may include:  ? Chest pain or pressure, or a strange feeling in the chest.  ? Sweating. ? Shortness of breath. ? Nausea or vomiting. ? Pain, pressure, or a strange feeling in the back, neck, jaw, or upper belly or in one or both shoulders or arms. ? Lightheadedness or sudden weakness.   ? A fast or irregular heartbeat.     · You have symptoms of a stroke. These may include:  ? Sudden numbness, tingling, weakness, or loss of movement in your face, arm, or leg, especially on only one side of your body. ? Sudden vision changes. ? Sudden trouble speaking. ? Sudden confusion or trouble understanding simple statements. ? Sudden problems with walking or balance. ? A sudden, severe headache that is different from past headaches.     · You have severe back or belly pain.    Do not wait until your blood pressure comes down on its own. Get help right away.   Call your doctor now or seek immediate care if:    · Your blood pressure is much higher than normal (such as 180/120 or higher), but you don't have symptoms.     · You think high blood pressure is causing symptoms, such as:  ? Severe headache.  ? Blurry vision.    Watch closely for changes in your health, and be sure to contact your doctor if:    · Your blood pressure measures higher than your doctor recommends at least 2 times. That means the top number is higher or the bottom number is higher, or both.     · You think you may be having side effects from your blood pressure medicine. Where can you learn more? Go to http://ren-peggy.info/. Enter E126 in the search box to learn more about \"High Blood Pressure: Care Instructions. \"  Current as of: December 6, 2017  Content Version: 11.8  © 0345-3995 [employer]. Care instructions adapted under license by [employer] (which disclaims liability or warranty for this information). If you have questions about a medical condition or this instruction, always ask your healthcare professional. [address] any warranty or liability for your use of this information. [employer] Activation    Thank you for requesting access to [employer]. Please follow the instructions below to securely access and download your online medical record.  [employer] allows you to send messages to your doctor, view your test results, renew your prescriptions, schedule appointments, and more. How Do I Sign Up? 1. In your internet browser, go to [email or web address]. 2. Click on the First Time User? Click Here link in the Sign In box. You will see the New Member Sign Up page. 3. Enter your [employer] Access Code exactly as it appears below. You will not need to use this code after youve completed the sign-up process. If you do not sign up before the expiration date, you must request a new code. [employer] Access Code: [email or web address]  Expires: 2019  3:03 PM (This is the date your [employer] access code will )    4. Enter the last four digits of your Social Security Number (xxxx) and Date of Birth (mm/dd/yyyy) as indicated and click Submit. You will be taken to the next sign-up page. 5. Create a Your [employer] ID. This will be your [employer] login ID and cannot be changed, so think of one that is secure and easy to remember. 6. Create a [employer] password. You can change your password at any time. 7. Enter your Password Reset Question and Answer. This can be used at a later time if you forget your password. 8. Enter your e-mail address. You will receive e-mail notification when new information is available in [address] 9. Click Sign Up. You can now view and download portions of your medical record. 10. Click the Download Summary menu link to download a portable copy of your medical information. Additional Information    If you have questions, please visit the Frequently Asked Questions section of the [employer] website at [email or web address] Remember, [employer] is NOT to be used for urgent needs. For medical emergencies, dial 911.

## 2018-11-07 NOTE — PROGRESS NOTES
HISTORY OF PRESENT ILLNESS  Dax Saunders is a 77 y.o. male. CHF   The history is provided by the patient. This is a chronic problem. The problem occurs daily. The problem has been gradually improving. Shortness of Breath   The history is provided by the patient. This is a chronic problem. The problem occurs intermittently. The current episode started more than 1 week ago. The problem has been gradually improving. Associated symptoms include leg swelling. Pertinent negatives include no ear pain, no neck pain, no wheezing and no rash. Associated medical issues include heart failure. Palpitations    The history is provided by the patient. This is a chronic problem. The current episode started more than 1 week ago. The problem has not changed since onset. The problem occurs every several days. Risk factors include dyslipidemia, diabetes mellitus, hypertension and male gender. His past medical history is significant for atrial fibrillation. Review of Systems   Constitutional: Negative for chills and weight loss. HENT: Negative for ear pain and hearing loss. Eyes: Negative for blurred vision. Respiratory: Negative for wheezing and stridor. Cardiovascular: Positive for palpitations and leg swelling. Gastrointestinal: Negative for heartburn. Musculoskeletal: Negative for myalgias and neck pain. Skin: Negative for rash. Neurological: Negative for tingling, tremors, focal weakness and loss of consciousness. Psychiatric/Behavioral: Negative for depression and suicidal ideas. No family history on file.     Past Medical History:   Diagnosis Date    Arthritis     Cardiomegaly     Diabetes ([address]     Essential hypertension, benign     Hyperchloremia     Hypercholesteremia     Hypercholesteremia     Hypertension     Hypertriglyceridemia     Kidney stone     Nonspecific abnormal electrocardiogram (ECG) (EKG)     JUNCTIONAL RHYTHM    Nonspecific abnormal electrocardiogram (ECG) (EKG) JUNCTIONAL RHYTHM     Other and unspecified angina pectoris 10/17/2014    exertional angina stable better now     Pre-operative cardiovascular examination     Shortness of breath 10/17/2014    exertional hcvd     Type II or unspecified type diabetes mellitus without mention of complication, not stated as uncontrolled     Type II or unspecified type diabetes mellitus without mention of complication, not stated as uncontrolled        Past Surgical History:   Procedure Laterality Date    HX COLECTOMY      HX HERNIA REPAIR      HX ORTHOPAEDIC  5-8-2012    right knee replacement    HX POLYPECTOMY      HX TONSILLECTOMY         Social History     Tobacco Use    Smoking status: Never Smoker    Smokeless tobacco: Never Used   Substance Use Topics    Alcohol use: No       Allergies   Allergen Reactions    Aleve [Naproxen Sodium] Other (comments)     bleeding    Nsaids (Non-Steroidal Anti-Inflammatory Drug) Other (comments)     Risk for gastric erosions       Outpatient Medications Marked as Taking for the 11/7/18 encounter (Office Visit) with Renetta Oconnor MD   Medication Sig Dispense Refill    tamsulosin (FLOMAX) 0.4 mg capsule TAKE 1 CAPSULE DAILY 90 Cap 1    magnesium oxide (MAG-OX) 400 mg tablet Take 1 Tab by mouth two (2) times a day. 14 Tab 0    potassium chloride (K-DUR, KLOR-CON) 20 mEq tablet Take 1 Tab by mouth daily. 30 Tab 6    mupirocin (BACTROBAN) 2 % ointment Apply  to affected area daily.  metoprolol tartrate (LOPRESSOR) 100 mg IR tablet Take 50 mg by mouth daily.  quinapril (ACCUPRIL) 20 mg tablet Take 1 Tab by mouth daily. (Patient taking differently: Take 0.5 mg by mouth daily.) 90 Tab 3    sildenafil citrate (VIAGRA) 100 mg tablet Take 1 Tab by mouth daily as needed. 6 Tab 6    levETIRAcetam (KEPPRA) 750 mg tablet 750 mg.      glucose blood VI test strips (ASCENSIA AUTODISC VI, ONE TOUCH ULTRA TEST VI) strip 100 Each.       finasteride (PROSCAR) 5 mg tablet Take 1 Tab by mouth daily. 90 Tab 3    omeprazole (PRILOSEC) 20 mg capsule Take 20 mg by mouth daily.  pioglitazone (ACTOS) 30 mg tablet Take 30 mg by mouth daily.  multivitamins-minerals-lutein (CENTRUM SILVER) Tab Take 1 Tab by mouth.  glimepiride (AMARYL) 4 mg tablet Take 4 mg by mouth every morning. 1/2 tablet twice a day       latanoprost (XALATAN) 0.005 % ophthalmic solution Administer 1 Drop to both eyes daily.  metFORMIN (GLUCOPHAGE) 1,000 mg tablet Take 1,000 mg by mouth two (2) times daily (with meals).  pravastatin (PRAVACHOL) 80 mg tablet Take 40 mg by mouth daily. Visit Vitals  /72   Pulse 85   Ht 5' 9\" (1.753 m)   Wt 108.9 kg (240 lb)   BMI 35.44 kg/m²     Physical Exam   Constitutional: He is oriented to person, place, and time. He appears well-developed and well-nourished. No distress. HENT:   Head: Atraumatic. Mouth/Throat: No oropharyngeal exudate. Eyes: Conjunctivae are normal. No scleral icterus. Neck: Normal range of motion. Neck supple. No JVD present. No tracheal deviation present. No thyromegaly present. Cardiovascular: Normal rate. An irregularly irregular rhythm present. Exam reveals no gallop. No murmur heard. Pulses:       Dorsalis pedis pulses are 1+ on the right side, and 1+ on the left side. Posterior tibial pulses are 1+ on the right side, and 1+ on the left side. Pulmonary/Chest: Effort normal and breath sounds normal. No stridor. No respiratory distress. He has no wheezes. He has no rales. He exhibits no tenderness. Abdominal: Soft. There is no tenderness. There is no rebound and no guarding. Musculoskeletal: Normal range of motion. He exhibits no edema or tenderness. Lymphadenopathy:     He has no cervical adenopathy. Neurological: He is alert and oriented to person, place, and time. He exhibits normal muscle tone. Skin: Skin is warm. He is not diaphoretic. Psychiatric: He has a normal mood and affect. His behavior is normal.     ekg atrial fibrillation with no acute st-t changes. ECHO 1/2016:    VERY TECHNICALLY DIFFICULT STUDY. NORMAL LEFT VENTRICULAR SYSTOLIC FUNCTION WITH AN ESTIMATED EJECTION FRACTION OF 55%. NORMAL DIASTOLIC FUNCTION. DILATED LEFT ATRIUM. NO HEMODYNAMICALLY SIGNIFICANT VALVULAR PATHOLOGY. NO EVIDENCE OF PULMONARY HYPERTENSION DUE TO LACK OF TR JET. NO PREVIOUS REPORT FOR COMPARISON. ASSESSMENT and PLAN    ICD-10-CM ICD-9-CM    1. Chronic diastolic congestive heart failure (HCC) I50.32 428.32      428.0    2. Chronic atrial fibrillation (HCC) I48.2 427.31    3. Essential hypertension I10 401.9    4. Hypercholesteremia E78.00 272.0    5. Gastrointestinal hemorrhage, unspecified gastrointestinal hemorrhage type K92.2 578.9    6. Type 2 diabetes mellitus without complication, unspecified whether long term insulin use (HCC) E11.9 250.00      No orders of the defined types were placed in this encounter. Follow-up Disposition:  Return in about 2 months (around 1/7/2019). reviewed diet, exercise and weight control  cardiovascular risk and specific lipid/LDL goals reviewed  use of aspirin to prevent MI and TIA's discussed. Patient with chronic diastolic CHF- NYHA class II-   Patient was taking Eliquis 2.5 mg twice daily for chronic atrial fibrillation. He was recently admitted at hospital for GI bleed thought to be from diverticulosis. He is status post colonoscopy. He reports that his stool is cleared up and he does not have any further bleeding at this time. He is recommended to start aspirin next Monday and then switch to Eliquis 2.5 twice daily in a week from there. We will follow-up in 2 months.   He was advised to inform us or go to the ER immediately if bleeding persists or recurs

## 2018-11-07 NOTE — PROGRESS NOTES
1. Have you been to the ER, urgent care clinic since your last visit? Hospitalized since your last visit? Yes Where: [facility]    2. Have you seen or consulted any other health care providers outside of the [address] [place] [address] since your last visit? Include any pap smears or colon screening. No     3. Since your last visit, have you had any of the following symptoms?      dizziness. 4.  Have you had any blood work, X-rays or cardiac testing? Yes Where: [facility] Reason for visit: Labs        5. Where do you normally have your labs drawn? PCP    6. Do you need any refills today?    No

## 2018-11-26 ENCOUNTER — TELEPHONE (OUTPATIENT)
Dept: CARDIOLOGY CLINIC | Age: 78
End: 2018-11-26

## 2018-11-26 NOTE — TELEPHONE ENCOUNTER
Patient calling in question to eleiquis and aspirin instructions. Patient stated that he was taking ASA once daily the week of 11/14/18, and Eliqiuis once daily the week of 11/21/18. Patient stated that he had an incident of bleeding that he went to the hospital for (last ER visit admission on file 11/2/18 at [facility]) which he was taken off all his medications. , stating he doesn't have any issues with bleeding at \[facility] [place] [facility]\"" present time. Patient would like to know would you like for him to continue taking Eliquis once day or twice daily. Please advise.

## 2018-11-27 NOTE — TELEPHONE ENCOUNTER
Patient called and notified to take the Eliquis 2.5 mg bid, and stop the aspirin. Patient verbalized understanding.

## 2018-12-03 ENCOUNTER — TELEPHONE (OUTPATIENT)
Dept: CARDIOLOGY CLINIC | Age: 78
End: 2018-12-03

## 2018-12-03 DIAGNOSIS — I50.32 CHRONIC DIASTOLIC HEART FAILURE (HCC): Primary | ICD-10-CM

## 2018-12-03 NOTE — TELEPHONE ENCOUNTER
Paper message per NP ANGEL Spence in 2 weeks. Patient was called and made aware. He voices understanding and acceptance of this advice and will call back if any further questions or concerns.

## 2018-12-17 LAB
ANION GAP SERPL CALC-SCNC: 10 MMOL/L
BUN SERPL-MCNC: 16 MG/DL (ref 6–22)
CALCIUM SERPL-MCNC: 9 MG/DL (ref 8.4–10.4)
CHLORIDE SERPL-SCNC: 105 MMOL/L (ref 98–110)
CO2 SERPL-SCNC: 26 MMOL/L (ref 20–32)
CREAT SERPL-MCNC: 0.8 MG/DL (ref 0.8–1.6)
GFRAA, 66117: >60
GFRNA, 66118: >60
GLUCOSE SERPL-MCNC: 126 MG/DL (ref 70–99)
POTASSIUM SERPL-SCNC: 4 MMOL/L (ref 3.5–5.5)
SODIUM SERPL-SCNC: 141 MMOL/L (ref 133–145)

## 2018-12-26 DIAGNOSIS — I50.32 CHRONIC DIASTOLIC HEART FAILURE (HCC): Primary | ICD-10-CM

## 2018-12-26 RX ORDER — POTASSIUM CHLORIDE 20 MEQ/1
20 TABLET, EXTENDED RELEASE ORAL DAILY
Qty: 30 TAB | Refills: 6 | Status: ON HOLD | OUTPATIENT
Start: 2018-12-26 | End: 2021-10-11

## 2018-12-26 NOTE — TELEPHONE ENCOUNTER
Requested Prescriptions     Pending Prescriptions Disp Refills    potassium chloride (K-DUR, KLOR-CON) 20 mEq tablet 30 Tab 6     Sig: Take 1 Tab by mouth daily.

## 2019-01-07 RX ORDER — FUROSEMIDE 40 MG/1
40 TABLET ORAL 2 TIMES DAILY
Qty: 60 TAB | Refills: 6 | Status: ON HOLD | OUTPATIENT
Start: 2019-01-07 | End: 2021-10-11

## 2019-01-16 ENCOUNTER — OFFICE VISIT (OUTPATIENT)
Dept: CARDIOLOGY CLINIC | Age: 79
End: 2019-01-16

## 2019-01-16 VITALS
HEIGHT: 69 IN | BODY MASS INDEX: 34.21 KG/M2 | HEART RATE: 81 BPM | SYSTOLIC BLOOD PRESSURE: 145 MMHG | DIASTOLIC BLOOD PRESSURE: 80 MMHG | WEIGHT: 231 LBS

## 2019-01-16 DIAGNOSIS — I50.32 CHRONIC DIASTOLIC HEART FAILURE (HCC): Primary | ICD-10-CM

## 2019-01-16 DIAGNOSIS — I10 ESSENTIAL HYPERTENSION: ICD-10-CM

## 2019-01-16 DIAGNOSIS — E78.00 HYPERCHOLESTEREMIA: ICD-10-CM

## 2019-01-16 DIAGNOSIS — K92.2 GASTROINTESTINAL HEMORRHAGE, UNSPECIFIED GASTROINTESTINAL HEMORRHAGE TYPE: ICD-10-CM

## 2019-01-16 DIAGNOSIS — E11.9 TYPE 2 DIABETES MELLITUS WITHOUT COMPLICATION, UNSPECIFIED WHETHER LONG TERM INSULIN USE (HCC): ICD-10-CM

## 2019-01-16 DIAGNOSIS — I48.20 CHRONIC ATRIAL FIBRILLATION (HCC): ICD-10-CM

## 2019-01-16 NOTE — PATIENT INSTRUCTIONS
Atrial Fibrillation: Care Instructions  Your Care Instructions    Atrial fibrillation is an irregular and often fast heartbeat. Treating this condition is important for several reasons. It can cause blood clots, which can travel from your heart to your brain and cause a stroke. If you have a fast heartbeat, you may feel lightheaded, dizzy, and weak. An irregular heartbeat can also increase your risk for heart failure. Atrial fibrillation is often the result of another heart condition, such as high blood pressure or coronary artery disease. Making changes to improve your heart condition will help you stay healthy and active. Follow-up care is a key part of your treatment and safety. Be sure to make and go to all appointments, and call your doctor if you are having problems. It's also a good idea to know your test results and keep a list of the medicines you take. How can you care for yourself at home? Medicines    · Take your medicines exactly as prescribed. Call your doctor if you think you are having a problem with your medicine. You will get more details on the specific medicines your doctor prescribes.     · If your doctor has given you a blood thinner to prevent a stroke, be sure you get instructions about how to take your medicine safely. Blood thinners can cause serious bleeding problems.     · Do not take any vitamins, over-the-counter drugs, or herbal products without talking to your doctor first.    Lifestyle changes    · Do not smoke. Smoking can increase your chance of a stroke and heart attack. If you need help quitting, talk to your doctor about stop-smoking programs and medicines. These can increase your chances of quitting for good.     · Eat a heart-healthy diet.     · Stay at a healthy weight. Lose weight if you need to.     · Limit alcohol to 2 drinks a day for men and 1 drink a day for women. Too much alcohol can cause health problems.     · Avoid colds and flu.  Get a pneumococcal vaccine shot. If you have had one before, ask your doctor whether you need another dose. Get a flu shot every year. If you must be around people with colds or flu, wash your hands often. Activity    · If your doctor recommends it, get more exercise. Walking is a good choice. Bit by bit, increase the amount you walk every day. Try for at least 30 minutes on most days of the week. You also may want to swim, bike, or do other activities. Your doctor may suggest that you join a cardiac rehabilitation program so that you can have help increasing your physical activity safely.     · Start light exercise if your doctor says it is okay. Even a small amount will help you get stronger, have more energy, and manage stress. Walking is an easy way to get exercise. Start out by walking a little more than you did in the hospital. Gradually increase the amount you walk.     · When you exercise, watch for signs that your heart is working too hard. You are pushing too hard if you cannot talk while you are exercising. If you become short of breath or dizzy or have chest pain, sit down and rest immediately.     · Check your pulse regularly. Place two fingers on the artery at the palm side of your wrist, in line with your thumb. If your heartbeat seems uneven or fast, talk to your doctor. When should you call for help? Call 911 anytime you think you may need emergency care. For example, call if:    · You have symptoms of a heart attack. These may include:  ? Chest pain or pressure, or a strange feeling in the chest.  ? Sweating. ? Shortness of breath. ? Nausea or vomiting. ? Pain, pressure, or a strange feeling in the back, neck, jaw, or upper belly or in one or both shoulders or arms. ? Lightheadedness or sudden weakness. ? A fast or irregular heartbeat. After you call 911, the  may tell you to chew 1 adult-strength or 2 to 4 low-dose aspirin. Wait for an ambulance.  Do not try to drive yourself.     · You have symptoms of a stroke. These may include:  ? Sudden numbness, tingling, weakness, or loss of movement in your face, arm, or leg, especially on only one side of your body. ? Sudden vision changes. ? Sudden trouble speaking. ? Sudden confusion or trouble understanding simple statements. ? Sudden problems with walking or balance. ? A sudden, severe headache that is different from past headaches.     · You passed out (lost consciousness).    Call your doctor now or seek immediate medical care if:    · You have new or increased shortness of breath.     · You feel dizzy or lightheaded, or you feel like you may faint.     · Your heart rate becomes irregular.     · You can feel your heart flutter in your chest or skip heartbeats. Tell your doctor if these symptoms are new or worse.    Watch closely for changes in your health, and be sure to contact your doctor if you have any problems. Where can you learn more? Go to http://ren-peggy.info/. Enter U020 in the search box to learn more about \"Atrial Fibrillation: Care Instructions. \"  Current as of: December 6, 2017  Content Version: 11.8  © 2304-8479 [employer]. Care instructions adapted under license by [employer] (which disclaims liability or warranty for this information). If you have questions about a medical condition or this instruction, always ask your healthcare professional. [address] any warranty or liability for your use of this information. BookNow Activation    Thank you for requesting access to BookNow. Please follow the instructions below to securely access and download your online medical record. BookNow allows you to send messages to your doctor, view your test results, renew your prescriptions, schedule appointments, and more. How Do I Sign Up? 1. In your internet browser, go to [email or web address]. 2. Click on the First Time User?  Click Here link in the Sign In box. You will see the New Member Sign Up page. 3. Enter your ViaSat Access Code exactly as it appears below. You will not need to use this code after youve completed the sign-up process. If you do not sign up before the expiration date, you must request a new code. ViaSat Access Code: [email or web address]  Expires: 3/2/2019 12:01 PM (This is the date your ViaSat access code will )    4. Enter the last four digits of your Social Security Number (xxxx) and Date of Birth (mm/dd/yyyy) as indicated and click Submit. You will be taken to the next sign-up page. 5. Create a Shakat ID. This will be your ViaSat login ID and cannot be changed, so think of one that is secure and easy to remember. 6. Create a ViaSat password. You can change your password at any time. 7. Enter your Password Reset Question and Answer. This can be used at a later time if you forget your password. 8. Enter your e-mail address. You will receive e-mail notification when new information is available in [address] 9. Click Sign Up. You can now view and download portions of your medical record. 10. Click the Download Summary menu link to download a portable copy of your medical information. Additional Information    If you have questions, please visit the Frequently Asked Questions section of the ViaSat website at [email or web address] Remember, ViaSat is NOT to be used for urgent needs. For medical emergencies, dial 911.

## 2019-01-16 NOTE — PROGRESS NOTES
1. Have you been to the ER, urgent care clinic since your last visit? Hospitalized since your last visit?     no    2. Have you seen or consulted any other health care providers outside of the [address] [place] [address] since your last visit? Include any pap smears or colon screening. No     3. Since your last visit, have you had any of the following symptoms?      swelling in legs/arms.

## 2019-01-16 NOTE — PROGRESS NOTES
HISTORY OF PRESENT ILLNESS  Dax Cole is a 77 y.o. male. CHF   The history is provided by the patient. This is a chronic problem. The problem occurs daily. The problem has been gradually improving. Shortness of Breath   The history is provided by the patient. This is a chronic problem. The problem occurs intermittently. The current episode started more than 1 week ago. The problem has been gradually improving. Associated symptoms include leg swelling. Pertinent negatives include no ear pain, no neck pain, no wheezing and no rash. Associated medical issues include heart failure. Palpitations    The history is provided by the patient. This is a chronic problem. The current episode started more than 1 week ago. The problem has not changed since onset. The problem occurs every several days. Risk factors include dyslipidemia, diabetes mellitus, hypertension and male gender. His past medical history is significant for atrial fibrillation. Review of Systems   Constitutional: Negative for chills and weight loss. HENT: Negative for ear pain and hearing loss. Eyes: Negative for blurred vision. Respiratory: Negative for wheezing and stridor. Cardiovascular: Positive for palpitations and leg swelling. Gastrointestinal: Negative for heartburn. Musculoskeletal: Negative for myalgias and neck pain. Skin: Negative for rash. Neurological: Negative for tingling, tremors, focal weakness and loss of consciousness. Psychiatric/Behavioral: Negative for depression and suicidal ideas. No family history on file.     Past Medical History:   Diagnosis Date    Arthritis     Cardiomegaly     Diabetes ([address]     Essential hypertension, benign     Hyperchloremia     Hypercholesteremia     Hypercholesteremia     Hypertension     Hypertriglyceridemia     Kidney stone     Nonspecific abnormal electrocardiogram (ECG) (EKG)     JUNCTIONAL RHYTHM    Nonspecific abnormal electrocardiogram (ECG) (EKG) JUNCTIONAL RHYTHM     Other and unspecified angina pectoris 10/17/2014    exertional angina stable better now     Pre-operative cardiovascular examination     Shortness of breath 10/17/2014    exertional hcvd     Type II or unspecified type diabetes mellitus without mention of complication, not stated as uncontrolled     Type II or unspecified type diabetes mellitus without mention of complication, not stated as uncontrolled        Past Surgical History:   Procedure Laterality Date    HX COLECTOMY      HX HERNIA REPAIR      HX ORTHOPAEDIC  5-8-2012    right knee replacement    HX POLYPECTOMY      HX TONSILLECTOMY         Social History     Tobacco Use    Smoking status: Never Smoker    Smokeless tobacco: Never Used   Substance Use Topics    Alcohol use: No       Allergies   Allergen Reactions    Aleve [Naproxen Sodium] Other (comments)     bleeding    Nsaids (Non-Steroidal Anti-Inflammatory Drug) Other (comments)     Risk for gastric erosions       Outpatient Medications Marked as Taking for the 1/16/19 encounter (Office Visit) with Humberto Wei MD   Medication Sig Dispense Refill    furosemide (LASIX) 40 mg tablet Take 1 Tab by mouth two (2) times a day. 60 Tab 6    potassium chloride (K-DUR, KLOR-CON) 20 mEq tablet Take 1 Tab by mouth daily. 30 Tab 6    tamsulosin (FLOMAX) 0.4 mg capsule TAKE 1 CAPSULE DAILY 90 Cap 1    magnesium oxide (MAG-OX) 400 mg tablet Take 1 Tab by mouth two (2) times a day. 14 Tab 0    mupirocin (BACTROBAN) 2 % ointment Apply  to affected area daily.  metoprolol tartrate (LOPRESSOR) 100 mg IR tablet Take 50 mg by mouth daily.  quinapril (ACCUPRIL) 20 mg tablet Take 1 Tab by mouth daily. (Patient taking differently: Take 0.5 mg by mouth daily.) 90 Tab 3    mirabegron ER (MYRBETRIQ) 50 mg ER tablet Take 1 Tab by mouth daily. 90 Tab 1    sildenafil citrate (VIAGRA) 100 mg tablet Take 1 Tab by mouth daily as needed.  6 Tab 6    levETIRAcetam (KEPPRA) 750 mg tablet 750 mg.      glucose blood VI test strips (ASCENSIA AUTODISC VI, ONE TOUCH ULTRA TEST VI) strip 100 Each.  finasteride (PROSCAR) 5 mg tablet Take 1 Tab by mouth daily. 90 Tab 3    omeprazole (PRILOSEC) 20 mg capsule Take 20 mg by mouth daily.  pioglitazone (ACTOS) 30 mg tablet Take 30 mg by mouth daily.  multivitamins-minerals-lutein (CENTRUM SILVER) Tab Take 1 Tab by mouth.  glimepiride (AMARYL) 4 mg tablet Take 4 mg by mouth every morning. 1/2 tablet twice a day       latanoprost (XALATAN) 0.005 % ophthalmic solution Administer 1 Drop to both eyes daily.  metFORMIN (GLUCOPHAGE) 1,000 mg tablet Take 1,000 mg by mouth two (2) times daily (with meals).  pravastatin (PRAVACHOL) 80 mg tablet Take 40 mg by mouth daily. Visit Vitals  /80   Pulse 81   Ht 5' 9\" (1.753 m)   Wt 104.8 kg (231 lb)   BMI 34.11 kg/m²     Physical Exam   Constitutional: He is oriented to person, place, and time. He appears well-developed and well-nourished. No distress. HENT:   Head: Atraumatic. Mouth/Throat: No oropharyngeal exudate. Eyes: Conjunctivae are normal. No scleral icterus. Neck: Normal range of motion. Neck supple. No JVD present. No tracheal deviation present. No thyromegaly present. Cardiovascular: Normal rate. An irregularly irregular rhythm present. Exam reveals no gallop. No murmur heard. Pulses:       Dorsalis pedis pulses are 1+ on the right side, and 1+ on the left side. Posterior tibial pulses are 1+ on the right side, and 1+ on the left side. Pulmonary/Chest: Effort normal and breath sounds normal. No stridor. No respiratory distress. He has no wheezes. He has no rales. He exhibits no tenderness. Abdominal: Soft. There is no tenderness. There is no rebound and no guarding. Musculoskeletal: Normal range of motion. He exhibits no edema or tenderness. Lymphadenopathy:     He has no cervical adenopathy.    Neurological: He is alert and oriented to person, place, and time. He exhibits normal muscle tone. Skin: Skin is warm. He is not diaphoretic. Psychiatric: He has a normal mood and affect. His behavior is normal.     ekg atrial fibrillation with no acute st-t changes. ECHO 1/2016:    VERY TECHNICALLY DIFFICULT STUDY. NORMAL LEFT VENTRICULAR SYSTOLIC FUNCTION WITH AN ESTIMATED EJECTION FRACTION OF 55%. NORMAL DIASTOLIC FUNCTION. DILATED LEFT ATRIUM. NO HEMODYNAMICALLY SIGNIFICANT VALVULAR PATHOLOGY. NO EVIDENCE OF PULMONARY HYPERTENSION DUE TO LACK OF TR JET. NO PREVIOUS REPORT FOR COMPARISON. ASSESSMENT and PLAN    ICD-10-CM ICD-9-CM    1. Chronic diastolic heart failure (HCC) I50.32 428.32    2. Chronic atrial fibrillation (HCC) I48.2 427.31    3. Essential hypertension I10 401.9    4. Hypercholesteremia E78.00 272.0    5. Gastrointestinal hemorrhage, unspecified gastrointestinal hemorrhage type K92.2 578.9    6. Type 2 diabetes mellitus without complication, unspecified whether long term insulin use (HCC) E11.9 250.00      No orders of the defined types were placed in this encounter. Follow-up Disposition:  Return in about 6 months (around 7/16/2019). reviewed diet, exercise and weight control  cardiovascular risk and specific lipid/LDL goals reviewed  use of aspirin to prevent MI and TIA's discussed. Patient with chronic diastolic CHF- NYHA class II-   Had recent GI bleed. Currently off aspirin. Recommended to take Eliquis 2.5 mg twice daily for oral anticoagulation. Meanwhile continue current medications and follow-up in 6 months.

## 2019-01-29 DIAGNOSIS — I10 ESSENTIAL HYPERTENSION: ICD-10-CM

## 2019-02-01 RX ORDER — QUINAPRIL 20 MG/1
TABLET ORAL
Qty: 90 TAB | Refills: 3 | Status: SHIPPED | OUTPATIENT
Start: 2019-02-01

## 2019-07-17 ENCOUNTER — OFFICE VISIT (OUTPATIENT)
Dept: CARDIOLOGY CLINIC | Age: 79
End: 2019-07-17

## 2019-07-17 VITALS
HEART RATE: 74 BPM | BODY MASS INDEX: 33.62 KG/M2 | SYSTOLIC BLOOD PRESSURE: 136 MMHG | WEIGHT: 227 LBS | HEIGHT: 69 IN | DIASTOLIC BLOOD PRESSURE: 74 MMHG

## 2019-07-17 DIAGNOSIS — K92.2 GASTROINTESTINAL HEMORRHAGE, UNSPECIFIED GASTROINTESTINAL HEMORRHAGE TYPE: ICD-10-CM

## 2019-07-17 DIAGNOSIS — E78.00 HYPERCHOLESTEREMIA: ICD-10-CM

## 2019-07-17 DIAGNOSIS — E11.9 TYPE 2 DIABETES MELLITUS WITHOUT COMPLICATION, UNSPECIFIED WHETHER LONG TERM INSULIN USE (HCC): ICD-10-CM

## 2019-07-17 DIAGNOSIS — I48.20 CHRONIC ATRIAL FIBRILLATION (HCC): ICD-10-CM

## 2019-07-17 DIAGNOSIS — I50.32 CHRONIC DIASTOLIC HEART FAILURE (HCC): Primary | ICD-10-CM

## 2019-07-17 DIAGNOSIS — I10 ESSENTIAL HYPERTENSION: ICD-10-CM

## 2019-07-17 NOTE — PATIENT INSTRUCTIONS
Atrial Fibrillation: Care Instructions  Your Care Instructions    Atrial fibrillation is an irregular and often fast heartbeat. Treating this condition is important for several reasons. It can cause blood clots, which can travel from your heart to your brain and cause a stroke. If you have a fast heartbeat, you may feel lightheaded, dizzy, and weak. An irregular heartbeat can also increase your risk for heart failure. Atrial fibrillation is often the result of another heart condition, such as high blood pressure or coronary artery disease. Making changes to improve your heart condition will help you stay healthy and active. Follow-up care is a key part of your treatment and safety. Be sure to make and go to all appointments, and call your doctor if you are having problems. It's also a good idea to know your test results and keep a list of the medicines you take. How can you care for yourself at home? Medicines    · Take your medicines exactly as prescribed. Call your doctor if you think you are having a problem with your medicine. You will get more details on the specific medicines your doctor prescribes.     · If your doctor has given you a blood thinner to prevent a stroke, be sure you get instructions about how to take your medicine safely. Blood thinners can cause serious bleeding problems.     · Do not take any vitamins, over-the-counter drugs, or herbal products without talking to your doctor first.    Lifestyle changes    · Do not smoke. Smoking can increase your chance of a stroke and heart attack. If you need help quitting, talk to your doctor about stop-smoking programs and medicines. These can increase your chances of quitting for good.     · Eat a heart-healthy diet.     · Stay at a healthy weight. Lose weight if you need to.     · Limit alcohol to 2 drinks a day for men and 1 drink a day for women. Too much alcohol can cause health problems.     · Avoid colds and flu.  Get a pneumococcal vaccine shot. If you have had one before, ask your doctor whether you need another dose. Get a flu shot every year. If you must be around people with colds or flu, wash your hands often. Activity    · If your doctor recommends it, get more exercise. Walking is a good choice. Bit by bit, increase the amount you walk every day. Try for at least 30 minutes on most days of the week. You also may want to swim, bike, or do other activities. Your doctor may suggest that you join a cardiac rehabilitation program so that you can have help increasing your physical activity safely.     · Start light exercise if your doctor says it is okay. Even a small amount will help you get stronger, have more energy, and manage stress. Walking is an easy way to get exercise. Start out by walking a little more than you did in the hospital. Gradually increase the amount you walk.     · When you exercise, watch for signs that your heart is working too hard. You are pushing too hard if you cannot talk while you are exercising. If you become short of breath or dizzy or have chest pain, sit down and rest immediately.     · Check your pulse regularly. Place two fingers on the artery at the palm side of your wrist, in line with your thumb. If your heartbeat seems uneven or fast, talk to your doctor. When should you call for help? Call 911 anytime you think you may need emergency care. For example, call if:    · You have symptoms of a heart attack. These may include:  ? Chest pain or pressure, or a strange feeling in the chest.  ? Sweating. ? Shortness of breath. ? Nausea or vomiting. ? Pain, pressure, or a strange feeling in the back, neck, jaw, or upper belly or in one or both shoulders or arms. ? Lightheadedness or sudden weakness. ? A fast or irregular heartbeat. After you call 911, the  may tell you to chew 1 adult-strength or 2 to 4 low-dose aspirin. Wait for an ambulance.  Do not try to drive yourself.     · You have symptoms of a stroke. These may include:  ? Sudden numbness, tingling, weakness, or loss of movement in your face, arm, or leg, especially on only one side of your body. ? Sudden vision changes. ? Sudden trouble speaking. ? Sudden confusion or trouble understanding simple statements. ? Sudden problems with walking or balance. ? A sudden, severe headache that is different from past headaches.     · You passed out (lost consciousness).    Call your doctor now or seek immediate medical care if:    · You have new or increased shortness of breath.     · You feel dizzy or lightheaded, or you feel like you may faint.     · Your heart rate becomes irregular.     · You can feel your heart flutter in your chest or skip heartbeats. Tell your doctor if these symptoms are new or worse.    Watch closely for changes in your health, and be sure to contact your doctor if you have any problems. Where can you learn more? Go to http://ren-peggy.info/. Enter U020 in the search box to learn more about \"Atrial Fibrillation: Care Instructions. \"  Current as of: July 22, 2018  Content Version: 11.9  © 1966-4903 [employer]. Care instructions adapted under license by [employer] (which disclaims liability or warranty for this information). If you have questions about a medical condition or this instruction, always ask your healthcare professional. [address] any warranty or liability for your use of this information. Appinions Activation    Thank you for requesting access to Appinions. Please follow the instructions below to securely access and download your online medical record. Appinions allows you to send messages to your doctor, view your test results, renew your prescriptions, schedule appointments, and more. How Do I Sign Up? 1. In your internet browser, go to [email or web address]. 2. Click on the First Time User?  Click Here link in the Sign In box. You will see the New Member Sign Up page. 3. Enter your Huddle Access Code exactly as it appears below. You will not need to use this code after youve completed the sign-up process. If you do not sign up before the expiration date, you must request a new code. Huddle Access Code: [email or web address]  Expires: 2019 10:07 AM (This is the date your Huddle access code will )    4. Enter the last four digits of your Social Security Number (xxxx) and Date of Birth (mm/dd/yyyy) as indicated and click Submit. You will be taken to the next sign-up page. 5. Create a Green Charge Networkst ID. This will be your Huddle login ID and cannot be changed, so think of one that is secure and easy to remember. 6. Create a Huddle password. You can change your password at any time. 7. Enter your Password Reset Question and Answer. This can be used at a later time if you forget your password. 8. Enter your e-mail address. You will receive e-mail notification when new information is available in [address] 9. Click Sign Up. You can now view and download portions of your medical record. 10. Click the Download Summary menu link to download a portable copy of your medical information. Additional Information    If you have questions, please visit the Frequently Asked Questions section of the Huddle website at [email or web address] Remember, Huddle is NOT to be used for urgent needs. For medical emergencies, dial 911.

## 2019-07-17 NOTE — PROGRESS NOTES
HISTORY OF PRESENT ILLNESS  Dax Brink Courser is a [de-identified] y.o. male. CHF   The history is provided by the patient. This is a chronic problem. The problem occurs daily. The problem has been gradually improving. Associated symptoms include shortness of breath. Shortness of Breath   The history is provided by the patient. This is a chronic problem. The problem occurs intermittently. The current episode started more than 1 week ago. The problem has been gradually improving. Associated symptoms include leg swelling. Pertinent negatives include no ear pain, no neck pain, no wheezing and no rash. Associated medical issues include heart failure. Palpitations    The history is provided by the patient. This is a chronic problem. The current episode started more than 1 week ago. The problem has not changed since onset. The problem occurs every several days. Associated symptoms include shortness of breath. Risk factors include dyslipidemia, diabetes mellitus, hypertension and male gender. His past medical history is significant for atrial fibrillation. Review of Systems   Constitutional: Negative for chills and weight loss. HENT: Negative for ear pain and hearing loss. Eyes: Negative for blurred vision. Respiratory: Positive for shortness of breath. Negative for wheezing and stridor. Cardiovascular: Positive for palpitations and leg swelling. Gastrointestinal: Negative for heartburn. Musculoskeletal: Negative for myalgias and neck pain. Skin: Negative for rash. Neurological: Negative for tingling, tremors, focal weakness and loss of consciousness. Psychiatric/Behavioral: Negative for depression and suicidal ideas. No family history on file.     Past Medical History:   Diagnosis Date    Arthritis     Cardiomegaly     Diabetes ([address]     Essential hypertension, benign     Hyperchloremia     Hypercholesteremia     Hypercholesteremia     Hypertension     Hypertriglyceridemia     Kidney stone  Nonspecific abnormal electrocardiogram (ECG) (EKG)     JUNCTIONAL RHYTHM    Nonspecific abnormal electrocardiogram (ECG) (EKG)     JUNCTIONAL RHYTHM     Other and unspecified angina pectoris 10/17/2014    exertional angina stable better now     Pre-operative cardiovascular examination     Shortness of breath 10/17/2014    exertional hcvd     Type II or unspecified type diabetes mellitus without mention of complication, not stated as uncontrolled     Type II or unspecified type diabetes mellitus without mention of complication, not stated as uncontrolled        Past Surgical History:   Procedure Laterality Date    HX COLECTOMY      HX HERNIA REPAIR      HX ORTHOPAEDIC  5-8-2012    right knee replacement    HX POLYPECTOMY      HX TONSILLECTOMY         Social History     Tobacco Use    Smoking status: Never Smoker    Smokeless tobacco: Never Used   Substance Use Topics    Alcohol use: No       Allergies   Allergen Reactions    Aleve [Naproxen Sodium] Other (comments)     bleeding    Nsaids (Non-Steroidal Anti-Inflammatory Drug) Other (comments)     Risk for gastric erosions       Outpatient Medications Marked as Taking for the 7/17/19 encounter (Office Visit) with Deangelo Faulkner MD   Medication Sig Dispense Refill    quinapril (ACCUPRIL) 20 mg tablet TAKE 1 TABLET DAILY 90 Tab 3    furosemide (LASIX) 40 mg tablet Take 1 Tab by mouth two (2) times a day. 60 Tab 6    potassium chloride (K-DUR, KLOR-CON) 20 mEq tablet Take 1 Tab by mouth daily. 30 Tab 6    magnesium oxide (MAG-OX) 400 mg tablet Take 1 Tab by mouth two (2) times a day. 14 Tab 0    mupirocin (BACTROBAN) 2 % ointment Apply  to affected area daily.  metoprolol tartrate (LOPRESSOR) 100 mg IR tablet Take 50 mg by mouth daily.  mirabegron ER (MYRBETRIQ) 50 mg ER tablet Take 1 Tab by mouth daily. 90 Tab 1    sildenafil citrate (VIAGRA) 100 mg tablet Take 1 Tab by mouth daily as needed.  6 Tab 6    ELIQUIS 5 mg tablet two (2) times a day.  0    levETIRAcetam (KEPPRA) 750 mg tablet 750 mg.      glucose blood VI test strips (ASCENSIA AUTODISC VI, ONE TOUCH ULTRA TEST VI) strip 100 Each.  finasteride (PROSCAR) 5 mg tablet Take 1 Tab by mouth daily. 90 Tab 3    omeprazole (PRILOSEC) 20 mg capsule Take 20 mg by mouth daily.  multivitamins-minerals-lutein (CENTRUM SILVER) Tab Take 1 Tab by mouth.  glimepiride (AMARYL) 4 mg tablet Take 4 mg by mouth every morning. 1/2 tablet twice a day       latanoprost (XALATAN) 0.005 % ophthalmic solution Administer 1 Drop to both eyes daily.  metFORMIN (GLUCOPHAGE) 1,000 mg tablet Take 1,000 mg by mouth two (2) times daily (with meals).  pravastatin (PRAVACHOL) 80 mg tablet Take 40 mg by mouth daily. Visit Vitals  /74   Pulse 74   Ht 5' 9\" (1.753 m)   Wt 103 kg (227 lb)   BMI 33.52 kg/m²     Physical Exam   Constitutional: He is oriented to person, place, and time. He appears well-developed and well-nourished. No distress. HENT:   Head: Atraumatic. Mouth/Throat: No oropharyngeal exudate. Eyes: Conjunctivae are normal. No scleral icterus. Neck: Normal range of motion. Neck supple. No JVD present. No tracheal deviation present. No thyromegaly present. Cardiovascular: Normal rate. An irregularly irregular rhythm present. Exam reveals no gallop. No murmur heard. Pulses:       Dorsalis pedis pulses are 1+ on the right side, and 1+ on the left side. Posterior tibial pulses are 1+ on the right side, and 1+ on the left side. Pulmonary/Chest: Effort normal and breath sounds normal. No stridor. No respiratory distress. He has no wheezes. He has no rales. He exhibits no tenderness. Abdominal: Soft. There is no tenderness. There is no rebound and no guarding. Musculoskeletal: Normal range of motion. He exhibits no edema or tenderness. Lymphadenopathy:     He has no cervical adenopathy.    Neurological: He is alert and oriented to person, place, and time. He exhibits normal muscle tone. Skin: Skin is warm. He is not diaphoretic. Psychiatric: He has a normal mood and affect. His behavior is normal.     ekg atrial fibrillation with no acute st-t changes. ECHO 1/2016:    VERY TECHNICALLY DIFFICULT STUDY. NORMAL LEFT VENTRICULAR SYSTOLIC FUNCTION WITH AN ESTIMATED EJECTION FRACTION OF 55%. NORMAL DIASTOLIC FUNCTION. DILATED LEFT ATRIUM. NO HEMODYNAMICALLY SIGNIFICANT VALVULAR PATHOLOGY. NO EVIDENCE OF PULMONARY HYPERTENSION DUE TO LACK OF TR JET. NO PREVIOUS REPORT FOR COMPARISON. ASSESSMENT and PLAN    ICD-10-CM ICD-9-CM    1. Chronic diastolic heart failure (HCC) I50.32 428.32 ECHO ADULT COMPLETE    NYHA class II   2. Essential hypertension I10 401.9    3. Chronic atrial fibrillation (HCC) I48.2 427.31    4. Hypercholesteremia E78.00 272.0    5. Gastrointestinal hemorrhage, unspecified gastrointestinal hemorrhage type K92.2 578.9    6. Type 2 diabetes mellitus without complication, unspecified whether long term insulin use (HCC) E11.9 250.00      No orders of the defined types were placed in this encounter. Follow-up and Dispositions    · Return in about 6 months (around 1/17/2020). reviewed diet, exercise and weight control  cardiovascular risk and specific lipid/LDL goals reviewed  use of aspirin to prevent MI and TIA's discussed. Patient with chronic diastolic CHF- NYHA class II-   Had recent GI bleed. Currently off aspirin. on Eliquis 2.5 mg twice daily for oral anticoagulation. continue current medications and follow-up in 6 months.

## 2020-10-21 ENCOUNTER — OFFICE VISIT (OUTPATIENT)
Dept: CARDIOLOGY CLINIC | Age: 80
End: 2020-10-21
Payer: COMMERCIAL

## 2020-10-21 VITALS
WEIGHT: 205 LBS | BODY MASS INDEX: 30.36 KG/M2 | HEIGHT: 69 IN | DIASTOLIC BLOOD PRESSURE: 83 MMHG | SYSTOLIC BLOOD PRESSURE: 123 MMHG | HEART RATE: 69 BPM | TEMPERATURE: 98.7 F

## 2020-10-21 DIAGNOSIS — K92.2 GASTROINTESTINAL HEMORRHAGE, UNSPECIFIED GASTROINTESTINAL HEMORRHAGE TYPE: ICD-10-CM

## 2020-10-21 DIAGNOSIS — I48.20 CHRONIC ATRIAL FIBRILLATION (HCC): ICD-10-CM

## 2020-10-21 DIAGNOSIS — E78.00 HYPERCHOLESTEREMIA: ICD-10-CM

## 2020-10-21 DIAGNOSIS — I50.32 CHRONIC DIASTOLIC HEART FAILURE (HCC): Primary | ICD-10-CM

## 2020-10-21 DIAGNOSIS — I10 ESSENTIAL HYPERTENSION: ICD-10-CM

## 2020-10-21 DIAGNOSIS — E11.9 TYPE 2 DIABETES MELLITUS WITHOUT COMPLICATION, UNSPECIFIED WHETHER LONG TERM INSULIN USE (HCC): ICD-10-CM

## 2020-10-21 PROCEDURE — 93000 ELECTROCARDIOGRAM COMPLETE: CPT | Performed by: INTERNAL MEDICINE

## 2020-10-21 PROCEDURE — 99214 OFFICE O/P EST MOD 30 MIN: CPT | Performed by: INTERNAL MEDICINE

## 2020-10-21 NOTE — PROGRESS NOTES
HISTORY OF PRESENT ILLNESS  Dax Covarrubias is a 79 y.o. male. CHF   The history is provided by the patient. This is a chronic problem. The problem occurs daily. The problem has been gradually improving. Associated symptoms include shortness of breath. Shortness of Breath   The history is provided by the patient. This is a chronic problem. The problem occurs intermittently. The current episode started more than 1 week ago. The problem has been gradually improving. Associated symptoms include leg swelling. Pertinent negatives include no ear pain, no neck pain, no wheezing and no rash. Associated medical issues include heart failure. Palpitations    The history is provided by the patient. This is a chronic problem. The current episode started more than 1 week ago. The problem has not changed since onset. The problem occurs every several days. Associated symptoms include shortness of breath. Risk factors include dyslipidemia, diabetes mellitus, hypertension and male gender. His past medical history is significant for atrial fibrillation. Review of Systems   Constitutional: Negative for chills and weight loss. HENT: Negative for ear pain and hearing loss. Eyes: Negative for blurred vision. Respiratory: Positive for shortness of breath. Negative for wheezing and stridor. Cardiovascular: Positive for palpitations and leg swelling. Gastrointestinal: Negative for heartburn. Musculoskeletal: Negative for myalgias and neck pain. Skin: Negative for rash. Neurological: Negative for tingling, tremors, focal weakness and loss of consciousness. Psychiatric/Behavioral: Negative for depression and suicidal ideas. No family history on file.     Past Medical History:   Diagnosis Date    Arthritis     Cardiomegaly     Diabetes ([address]     Essential hypertension, benign     Hyperchloremia     Hypercholesteremia     Hypercholesteremia     Hypertension     Hypertriglyceridemia     Kidney stone     Nonspecific abnormal electrocardiogram (ECG) (EKG)     JUNCTIONAL RHYTHM    Nonspecific abnormal electrocardiogram (ECG) (EKG)     JUNCTIONAL RHYTHM     Other and unspecified angina pectoris 10/17/2014    exertional angina stable better now     Pre-operative cardiovascular examination     Shortness of breath 10/17/2014    exertional hcvd     Type II or unspecified type diabetes mellitus without mention of complication, not stated as uncontrolled     Type II or unspecified type diabetes mellitus without mention of complication, not stated as uncontrolled        Past Surgical History:   Procedure Laterality Date    HX COLECTOMY      HX HERNIA REPAIR      HX ORTHOPAEDIC  5-8-2012    right knee replacement    HX POLYPECTOMY      HX TONSILLECTOMY         Social History     Tobacco Use    Smoking status: Never Smoker    Smokeless tobacco: Never Used   Substance Use Topics    Alcohol use: No       Allergies   Allergen Reactions    Aleve [Naproxen Sodium] Other (comments)     bleeding    Nsaids (Non-Steroidal Anti-Inflammatory Drug) Other (comments)     Risk for gastric erosions       Outpatient Medications Marked as Taking for the 10/21/20 encounter (Office Visit) with Chapito Cho MD   Medication Sig Dispense Refill    dulaglutide (TRULICITY) 1.5 VI/6.1 mL sub-q pen 1.5 mg by SubCUTAneous route every seven (7) days.  tamsulosin (FLOMAX) 0.4 mg capsule TAKE 1 CAPSULE DAILY 90 Cap 0    quinapril (ACCUPRIL) 20 mg tablet TAKE 1 TABLET DAILY 90 Tab 3    furosemide (LASIX) 40 mg tablet Take 1 Tab by mouth two (2) times a day. 60 Tab 6    magnesium oxide (MAG-OX) 400 mg tablet Take 1 Tab by mouth two (2) times a day. 14 Tab 0    mupirocin (BACTROBAN) 2 % ointment Apply  to affected area daily.  metoprolol tartrate (LOPRESSOR) 100 mg IR tablet Take 50 mg by mouth daily.  sildenafil citrate (VIAGRA) 100 mg tablet Take 1 Tab by mouth daily as needed.  6 Tab 6    ELIQUIS 5 mg tablet 2.5 mg two (2) times a day.  0    levETIRAcetam (KEPPRA) 750 mg tablet 750 mg.      glucose blood VI test strips (ASCENSIA AUTODISC VI, ONE TOUCH ULTRA TEST VI) strip 100 Each.  finasteride (PROSCAR) 5 mg tablet Take 1 Tab by mouth daily. 90 Tab 3    omeprazole (PRILOSEC) 20 mg capsule Take 20 mg by mouth daily.  multivitamins-minerals-lutein (CENTRUM SILVER) Tab Take 1 Tab by mouth.  glimepiride (AMARYL) 4 mg tablet Take 4 mg by mouth every morning. 1/2 tablet twice a day       latanoprost (XALATAN) 0.005 % ophthalmic solution Administer 1 Drop to both eyes daily.  pravastatin (PRAVACHOL) 80 mg tablet Take 40 mg by mouth daily. Visit Vitals  /83   Pulse 69   Temp 98.7 °F (37.1 °C) (Temporal)   Ht 5' 9\" (1.753 m)   Wt 93 kg (205 lb)   BMI 30.27 kg/m²     Physical Exam   Constitutional: He is oriented to person, place, and time. He appears well-developed and well-nourished. No distress. HENT:   Head: Atraumatic. Mouth/Throat: No oropharyngeal exudate. Eyes: Conjunctivae are normal. No scleral icterus. Neck: Normal range of motion. Neck supple. No JVD present. No tracheal deviation present. No thyromegaly present. Cardiovascular: Normal rate. An irregularly irregular rhythm present. Exam reveals no gallop. No murmur heard. Pulmonary/Chest: Effort normal and breath sounds normal. No stridor. No respiratory distress. He has no wheezes. He has no rales. He exhibits no tenderness. Abdominal: Soft. There is no abdominal tenderness. There is no rebound and no guarding. Musculoskeletal: Normal range of motion. General: No tenderness or edema. Lymphadenopathy:     He has no cervical adenopathy. Neurological: He is alert and oriented to person, place, and time. He exhibits normal muscle tone. Skin: Skin is warm. He is not diaphoretic. Psychiatric: He has a normal mood and affect.  His behavior is normal.     ekg atrial fibrillation with no acute st-t changes. ECHO 1/2016:    VERY TECHNICALLY DIFFICULT STUDY. NORMAL LEFT VENTRICULAR SYSTOLIC FUNCTION WITH AN ESTIMATED EJECTION FRACTION OF 55%. NORMAL DIASTOLIC FUNCTION. DILATED LEFT ATRIUM. NO HEMODYNAMICALLY SIGNIFICANT VALVULAR PATHOLOGY. NO EVIDENCE OF PULMONARY HYPERTENSION DUE TO LACK OF TR JET. NO PREVIOUS REPORT FOR COMPARISON. ASSESSMENT and PLAN    ICD-10-CM ICD-9-CM    1. Chronic diastolic heart failure (HCC)  I50.32 428.32 AMB POC EKG ROUTINE W/ 12 LEADS, INTER & REP   2. Essential hypertension  I10 401.9    3. Chronic atrial fibrillation (HCC)  I48.20 427.31    4. Hypercholesteremia  E78.00 272.0    5. Gastrointestinal hemorrhage, unspecified gastrointestinal hemorrhage type  K92.2 578.9    6. Type 2 diabetes mellitus without complication, unspecified whether long term insulin use (HCC)  E11.9 250.00      Orders Placed This Encounter    AMB POC EKG ROUTINE W/ 12 LEADS, INTER & REP     Order Specific Question:   Reason for Exam:     Answer:   pre op     Follow-up and Dispositions    · Return in about 6 months (around 4/21/2021). reviewed diet, exercise and weight control  cardiovascular risk and specific lipid/LDL goals reviewed  use of aspirin to prevent MI and TIA's discussed. Patient with chronic diastolic CHF- NYHA class II- stable mild dyspnea, no chest pain  on Eliquis 2.5 mg twice daily for oral anticoagulation. Scheduled for urology procedure-- okay to hold eliquis 2-3 days prior to procedure and resume postop asap. Can proceed to planned surgery with low cardiac risk.

## 2020-10-21 NOTE — PROGRESS NOTES
1. Have you been to the ER, urgent care clinic since your last visit? Hospitalized since your last visit?     no  2. Have you seen or consulted any other health care providers outside of the [address] [place] [address] since your last visit? Include any pap smears or colon screening.       No

## 2021-08-03 PROBLEM — I10 ESSENTIAL HYPERTENSION, BENIGN: Status: RESOLVED | Noted: 2021-08-03 | Resolved: 2021-08-03

## 2021-10-09 ENCOUNTER — APPOINTMENT (OUTPATIENT)
Dept: CT IMAGING | Age: 81
End: 2021-10-09
Attending: STUDENT IN AN ORGANIZED HEALTH CARE EDUCATION/TRAINING PROGRAM
Payer: MEDICARE

## 2021-10-09 ENCOUNTER — APPOINTMENT (OUTPATIENT)
Dept: GENERAL RADIOLOGY | Age: 81
End: 2021-10-09
Attending: STUDENT IN AN ORGANIZED HEALTH CARE EDUCATION/TRAINING PROGRAM
Payer: MEDICARE

## 2021-10-09 ENCOUNTER — HOSPITAL ENCOUNTER (OUTPATIENT)
Age: 81
Setting detail: OBSERVATION
Discharge: HOME | End: 2021-10-11
Attending: STUDENT IN AN ORGANIZED HEALTH CARE EDUCATION/TRAINING PROGRAM | Admitting: INTERNAL MEDICINE
Payer: MEDICARE

## 2021-10-09 DIAGNOSIS — R55 SYNCOPE, UNSPECIFIED SYNCOPE TYPE: ICD-10-CM

## 2021-10-09 DIAGNOSIS — N17.9 AKI (ACUTE KIDNEY INJURY) (HCC): ICD-10-CM

## 2021-10-09 DIAGNOSIS — R55 SYNCOPE AND COLLAPSE: Primary | ICD-10-CM

## 2021-10-09 LAB
ALBUMIN SERPL-MCNC: 4 G/DL (ref 3.5–4.7)
ALBUMIN/GLOB SERPL: 1.1 {RATIO}
ALP SERPL-CCNC: 80 U/L (ref 38–126)
ALT SERPL-CCNC: 22 U/L (ref 3–72)
ANION GAP SERPL CALC-SCNC: 11 MMOL/L
AST SERPL W P-5'-P-CCNC: 29 U/L (ref 17–74)
BASOPHILS # BLD: 0 K/UL (ref 0–0.1)
BASOPHILS NFR BLD: 0 % (ref 0–2)
BILIRUB DIRECT SERPL-MCNC: 0.1 MG/DL (ref 0–0.3)
BILIRUB SERPL-MCNC: 1.2 MG/DL (ref 0.2–1)
BUN SERPL-MCNC: 23 MG/DL (ref 9–21)
BUN/CREAT SERPL: 15
CA-I BLD-MCNC: 9.8 MG/DL (ref 8.5–10.5)
CHLORIDE SERPL-SCNC: 99 MMOL/L (ref 94–111)
CO2 SERPL-SCNC: 28 MMOL/L (ref 21–33)
CREAT SERPL-MCNC: 1.5 MG/DL (ref 0.8–1.5)
DIFFERENTIAL METHOD BLD: ABNORMAL
EOSINOPHIL # BLD: 0 K/UL (ref 0–0.4)
EOSINOPHIL NFR BLD: 0 % (ref 0–5)
ERYTHROCYTE [DISTWIDTH] IN BLOOD BY AUTOMATED COUNT: 13.2 % (ref 11.6–14.5)
GLOBULIN SER CALC-MCNC: 3.7 G/DL
GLUCOSE BLD STRIP.AUTO-MCNC: 218 MG/DL (ref 70–110)
GLUCOSE SERPL-MCNC: 229 MG/DL (ref 70–110)
HCT VFR BLD AUTO: 44.6 % (ref 36–48)
HGB BLD-MCNC: 14.6 G/DL (ref 13–16)
IMM GRANULOCYTES # BLD AUTO: 0.1 K/UL (ref 0–0.04)
IMM GRANULOCYTES NFR BLD AUTO: 1 % (ref 0–0.5)
INR PPP: 1.2 (ref 0.8–1.2)
LIPASE SERPL-CCNC: 26 U/L (ref 10–57)
LYMPHOCYTES # BLD: 0.5 K/UL (ref 0.9–3.6)
LYMPHOCYTES NFR BLD: 4 % (ref 21–52)
MAGNESIUM SERPL-MCNC: 1.9 MG/DL (ref 1.7–2.8)
MCH RBC QN AUTO: 35.4 PG (ref 24–34)
MCHC RBC AUTO-ENTMCNC: 32.7 G/DL (ref 31–37)
MCV RBC AUTO: 108 FL (ref 78–100)
MONOCYTES # BLD: 1.2 K/UL (ref 0.05–1.2)
MONOCYTES NFR BLD: 9 % (ref 3–10)
NEUTS SEG # BLD: 11.3 K/UL (ref 1.8–8)
NEUTS SEG NFR BLD: 86 % (ref 40–73)
NRBC # BLD: 0 K/UL (ref 0–0.01)
NRBC BLD-RTO: 0 PER 100 WBC
PERFORMED BY, TECHID: ABNORMAL
PLATELET # BLD AUTO: 157 K/UL (ref 135–420)
PMV BLD AUTO: 10.5 FL (ref 9.2–11.8)
POTASSIUM SERPL-SCNC: 3.8 MMOL/L (ref 3.2–5.1)
PROT SERPL-MCNC: 7.7 G/DL (ref 6.1–8.4)
PROTHROMBIN TIME: 14.3 SEC (ref 11.5–15.2)
RBC # BLD AUTO: 4.13 M/UL (ref 4.35–5.65)
SODIUM SERPL-SCNC: 138 MMOL/L (ref 135–145)
TROPONIN I SERPL-MCNC: 0.02 NG/ML (ref 0.02–0.05)
WBC # BLD AUTO: 13.2 K/UL (ref 4.6–13.2)

## 2021-10-09 PROCEDURE — 85025 COMPLETE CBC W/AUTO DIFF WBC: CPT

## 2021-10-09 PROCEDURE — 84484 ASSAY OF TROPONIN QUANT: CPT

## 2021-10-09 PROCEDURE — 83690 ASSAY OF LIPASE: CPT

## 2021-10-09 PROCEDURE — 70450 CT HEAD/BRAIN W/O DYE: CPT

## 2021-10-09 PROCEDURE — 74011000636 HC RX REV CODE- 636: Performed by: STUDENT IN AN ORGANIZED HEALTH CARE EDUCATION/TRAINING PROGRAM

## 2021-10-09 PROCEDURE — 80048 BASIC METABOLIC PNL TOTAL CA: CPT

## 2021-10-09 PROCEDURE — 74174 CTA ABD&PLVS W/CONTRAST: CPT

## 2021-10-09 PROCEDURE — 94761 N-INVAS EAR/PLS OXIMETRY MLT: CPT

## 2021-10-09 PROCEDURE — 93005 ELECTROCARDIOGRAM TRACING: CPT

## 2021-10-09 PROCEDURE — 85610 PROTHROMBIN TIME: CPT

## 2021-10-09 PROCEDURE — 99285 EMERGENCY DEPT VISIT HI MDM: CPT

## 2021-10-09 PROCEDURE — 83735 ASSAY OF MAGNESIUM: CPT

## 2021-10-09 PROCEDURE — 82962 GLUCOSE BLOOD TEST: CPT

## 2021-10-09 PROCEDURE — 74011250636 HC RX REV CODE- 250/636: Performed by: STUDENT IN AN ORGANIZED HEALTH CARE EDUCATION/TRAINING PROGRAM

## 2021-10-09 PROCEDURE — 71046 X-RAY EXAM CHEST 2 VIEWS: CPT

## 2021-10-09 PROCEDURE — 80076 HEPATIC FUNCTION PANEL: CPT

## 2021-10-09 RX ADMIN — IOPAMIDOL 95 ML: 755 INJECTION, SOLUTION INTRAVENOUS at 23:32

## 2021-10-09 RX ADMIN — SODIUM CHLORIDE 500 ML: 9 INJECTION, SOLUTION INTRAVENOUS at 21:36

## 2021-10-09 NOTE — Clinical Note
Status[de-identified] INPATIENT [101]   Type of Bed: Telemetry [19]   Cardiac Monitoring Required?: Yes   Inpatient Hospitalization Certified Necessary for the Following Reasons: 3.  Patient receiving treatment that can only be provided in an inpatient setting (further clarification in H&P documentation)   Admitting Diagnosis: Syncope [408754]   Admitting Physician: Jag Aceves   Attending Physician: Reji Monteiro [[id number]   Estimated Length of Stay: 2 Midnights   Discharge Plan[de-identified] Home with Office Follow-up

## 2021-10-10 PROBLEM — R55 SYNCOPE: Status: ACTIVE | Noted: 2021-10-10

## 2021-10-10 LAB
ANION GAP SERPL CALC-SCNC: 11 MMOL/L
ATRIAL RATE: 0 BPM
BASOPHILS # BLD: 0 K/UL (ref 0–0.1)
BASOPHILS NFR BLD: 0 % (ref 0–2)
BNP SERPL-MCNC: 207 PG/ML (ref 0–100)
BUN SERPL-MCNC: 25 MG/DL (ref 9–21)
BUN/CREAT SERPL: 19
CA-I BLD-MCNC: 9.2 MG/DL (ref 8.5–10.5)
CALCULATED R AXIS, ECG10: 35 DEGREES
CALCULATED T AXIS, ECG11: 3 DEGREES
CHLORIDE SERPL-SCNC: 105 MMOL/L (ref 94–111)
CO2 SERPL-SCNC: 23 MMOL/L (ref 21–33)
CREAT SERPL-MCNC: 1.3 MG/DL (ref 0.8–1.5)
DIAGNOSIS, 93000: NORMAL
DIFFERENTIAL METHOD BLD: ABNORMAL
EOSINOPHIL # BLD: 0 K/UL (ref 0–0.4)
EOSINOPHIL NFR BLD: 0 % (ref 0–5)
ERYTHROCYTE [DISTWIDTH] IN BLOOD BY AUTOMATED COUNT: 13.2 % (ref 11.6–14.5)
GLUCOSE SERPL-MCNC: 199 MG/DL (ref 70–110)
HCT VFR BLD AUTO: 38.5 % (ref 36–48)
HGB BLD-MCNC: 12.7 G/DL (ref 13–16)
IMM GRANULOCYTES # BLD AUTO: 0.1 K/UL (ref 0–0.04)
IMM GRANULOCYTES NFR BLD AUTO: 1 % (ref 0–0.5)
LYMPHOCYTES # BLD: 0.5 K/UL (ref 0.9–3.6)
LYMPHOCYTES NFR BLD: 4 % (ref 21–52)
MAGNESIUM SERPL-MCNC: 1.8 MG/DL (ref 1.7–2.8)
MCH RBC QN AUTO: 35.1 PG (ref 24–34)
MCHC RBC AUTO-ENTMCNC: 33 G/DL (ref 31–37)
MCV RBC AUTO: 106.4 FL (ref 78–100)
MONOCYTES # BLD: 1.2 K/UL (ref 0.05–1.2)
MONOCYTES NFR BLD: 10 % (ref 3–10)
NEUTS SEG # BLD: 9.7 K/UL (ref 1.8–8)
NEUTS SEG NFR BLD: 85 % (ref 40–73)
NRBC # BLD: 0 K/UL (ref 0–0.01)
NRBC BLD-RTO: 0 PER 100 WBC
P-R INTERVAL, ECG05: 151 MS
PLATELET # BLD AUTO: 152 K/UL (ref 135–420)
PMV BLD AUTO: 10.8 FL (ref 9.2–11.8)
POTASSIUM SERPL-SCNC: 4 MMOL/L (ref 3.2–5.1)
Q-T INTERVAL, ECG07: 394 MS
QRS DURATION, ECG06: 116 MS
QTC CALCULATION (BEZET), ECG08: 477 MS
RBC # BLD AUTO: 3.62 M/UL (ref 4.35–5.65)
SODIUM SERPL-SCNC: 139 MMOL/L (ref 135–145)
TROPONIN I SERPL-MCNC: <0.02 NG/ML (ref 0.02–0.05)
TSH SERPL DL<=0.05 MIU/L-ACNC: 2.31 UIU/ML (ref 0.35–6.2)
VENTRICULAR RATE, ECG03: 88 BPM
WBC # BLD AUTO: 11.5 K/UL (ref 4.6–13.2)

## 2021-10-10 PROCEDURE — 99218 HC RM OBSERVATION: CPT

## 2021-10-10 PROCEDURE — 74011250637 HC RX REV CODE- 250/637: Performed by: PHYSICIAN ASSISTANT

## 2021-10-10 PROCEDURE — 80048 BASIC METABOLIC PNL TOTAL CA: CPT

## 2021-10-10 PROCEDURE — 84443 ASSAY THYROID STIM HORMONE: CPT

## 2021-10-10 PROCEDURE — 83880 ASSAY OF NATRIURETIC PEPTIDE: CPT

## 2021-10-10 PROCEDURE — 85025 COMPLETE CBC W/AUTO DIFF WBC: CPT

## 2021-10-10 PROCEDURE — 65270000029 HC RM PRIVATE

## 2021-10-10 PROCEDURE — 74011250637 HC RX REV CODE- 250/637: Performed by: NURSE PRACTITIONER

## 2021-10-10 PROCEDURE — 83735 ASSAY OF MAGNESIUM: CPT

## 2021-10-10 RX ORDER — LATANOPROST 50 UG/ML
1 SOLUTION/ DROPS OPHTHALMIC DAILY
Status: DISCONTINUED | OUTPATIENT
Start: 2021-10-10 | End: 2021-10-11 | Stop reason: HOSPADM

## 2021-10-10 RX ORDER — SODIUM CHLORIDE 0.9 % (FLUSH) 0.9 %
5-40 SYRINGE (ML) INJECTION AS NEEDED
Status: DISCONTINUED | OUTPATIENT
Start: 2021-10-10 | End: 2021-10-11 | Stop reason: HOSPADM

## 2021-10-10 RX ORDER — LISINOPRIL 20 MG/1
20 TABLET ORAL DAILY
Status: DISCONTINUED | OUTPATIENT
Start: 2021-10-10 | End: 2021-10-11 | Stop reason: HOSPADM

## 2021-10-10 RX ORDER — FINASTERIDE 5 MG/1
5 TABLET, FILM COATED ORAL DAILY
Status: DISCONTINUED | OUTPATIENT
Start: 2021-10-10 | End: 2021-10-11 | Stop reason: HOSPADM

## 2021-10-10 RX ORDER — ACETAMINOPHEN 325 MG/1
650 TABLET ORAL
Status: DISCONTINUED | OUTPATIENT
Start: 2021-10-10 | End: 2021-10-11 | Stop reason: HOSPADM

## 2021-10-10 RX ORDER — METOPROLOL TARTRATE 50 MG/1
50 TABLET ORAL DAILY
Status: DISCONTINUED | OUTPATIENT
Start: 2021-10-10 | End: 2021-10-11 | Stop reason: HOSPADM

## 2021-10-10 RX ORDER — SODIUM CHLORIDE 0.9 % (FLUSH) 0.9 %
5-40 SYRINGE (ML) INJECTION EVERY 8 HOURS
Status: DISCONTINUED | OUTPATIENT
Start: 2021-10-10 | End: 2021-10-11 | Stop reason: HOSPADM

## 2021-10-10 RX ORDER — PANTOPRAZOLE SODIUM 40 MG/1
40 TABLET, DELAYED RELEASE ORAL
Status: DISCONTINUED | OUTPATIENT
Start: 2021-10-10 | End: 2021-10-11 | Stop reason: HOSPADM

## 2021-10-10 RX ORDER — MORPHINE SULFATE 2 MG/ML
2 INJECTION, SOLUTION INTRAMUSCULAR; INTRAVENOUS
Status: DISCONTINUED | OUTPATIENT
Start: 2021-10-10 | End: 2021-10-11 | Stop reason: HOSPADM

## 2021-10-10 RX ORDER — PRAVASTATIN SODIUM 20 MG/1
40 TABLET ORAL DAILY
Status: DISCONTINUED | OUTPATIENT
Start: 2021-10-10 | End: 2021-10-11 | Stop reason: HOSPADM

## 2021-10-10 RX ORDER — HYDROCODONE BITARTRATE AND ACETAMINOPHEN 5; 325 MG/1; MG/1
1 TABLET ORAL
Status: DISCONTINUED | OUTPATIENT
Start: 2021-10-10 | End: 2021-10-11 | Stop reason: HOSPADM

## 2021-10-10 RX ORDER — METRONIDAZOLE 250 MG/1
500 TABLET ORAL EVERY 12 HOURS
Status: DISCONTINUED | OUTPATIENT
Start: 2021-10-10 | End: 2021-10-11 | Stop reason: HOSPADM

## 2021-10-10 RX ORDER — LEVETIRACETAM 250 MG/1
750 TABLET ORAL 2 TIMES DAILY
Status: DISCONTINUED | OUTPATIENT
Start: 2021-10-10 | End: 2021-10-11 | Stop reason: HOSPADM

## 2021-10-10 RX ORDER — DOCUSATE SODIUM 100 MG/1
100 CAPSULE, LIQUID FILLED ORAL 2 TIMES DAILY
Status: DISCONTINUED | OUTPATIENT
Start: 2021-10-10 | End: 2021-10-11 | Stop reason: HOSPADM

## 2021-10-10 RX ADMIN — APIXABAN 5 MG: 5 TABLET, FILM COATED ORAL at 17:30

## 2021-10-10 RX ADMIN — LEVETIRACETAM 750 MG: 250 TABLET, FILM COATED ORAL at 17:30

## 2021-10-10 RX ADMIN — METOPROLOL TARTRATE 50 MG: 50 TABLET, FILM COATED ORAL at 08:44

## 2021-10-10 RX ADMIN — PRAVASTATIN SODIUM 40 MG: 20 TABLET ORAL at 08:44

## 2021-10-10 RX ADMIN — METRONIDAZOLE 500 MG: 250 TABLET ORAL at 21:28

## 2021-10-10 RX ADMIN — PANTOPRAZOLE SODIUM 40 MG: 40 TABLET, DELAYED RELEASE ORAL at 08:45

## 2021-10-10 RX ADMIN — APIXABAN 5 MG: 5 TABLET, FILM COATED ORAL at 08:44

## 2021-10-10 RX ADMIN — LEVETIRACETAM 750 MG: 250 TABLET, FILM COATED ORAL at 08:44

## 2021-10-10 RX ADMIN — Medication 10 ML: at 22:00

## 2021-10-10 RX ADMIN — LISINOPRIL 20 MG: 20 TABLET ORAL at 08:44

## 2021-10-10 RX ADMIN — FINASTERIDE 5 MG: 5 TABLET, FILM COATED ORAL at 08:44

## 2021-10-10 RX ADMIN — Medication 10 ML: at 05:54

## 2021-10-10 NOTE — ED NOTES
Ambulatory to restroom for BM, steady gait, denies chest pain or dizziness states is ready to go home.

## 2021-10-10 NOTE — PROGRESS NOTES
2018- bedside shift report completed and care of the patient assumed    0848- AM medications administered, patient eager for discharge    1235- daughter in room. Patient upset that [facility] doctor has seen him today\", patient advised that he was seen early this morning when admitted and will be rounded on by the NP this afternoon    [phone]- NP Cathryn Cleaning in room evaluating the patient. 1427-  Rounding on patient at this time, resting with even respirations. 1650- patient consumed 100% of dinner, daughter in room    1734- scheduled medication administered, fresh ice water provided.

## 2021-10-10 NOTE — ROUTINE PROCESS
TRANSFER - OUT REPORT:    Verbal report given to [address] [place] [address] on [address]  being transferred to [address] [place] [address] for routine progression of care       Report consisted of patients Situation, Background, Assessment and   Recommendations(SBAR). Information from the following report(s) ED Summary was reviewed with the receiving nurse. Lines:   Saline Lock 10/09/21 Left Antecubital (Active)        Opportunity for questions and clarification was provided.       Patient transported with:   Monitor

## 2021-10-10 NOTE — PROGRESS NOTES
Patient to floor from ED. Vital signs stable. No needs noted at this time.  Bed in lowest position and call bell in reach

## 2021-10-10 NOTE — ED TRIAGE NOTES
Witnessed syncopal episode at [facility]. Per witnesses patient was peeling potatoes and passed out, no seizure activity noted. Patient states had just been in bathroom prior to that trying to have BM. [place] like he had been constipated. Had been straining, had pain in abdomen radiating to back, neck and right shoulder.

## 2021-10-10 NOTE — ED NOTES
Spoke with patient's daughter regarding plan for admission. Is in agreement.  Password provided for release of patient information

## 2021-10-10 NOTE — PROGRESS NOTES
Hospitalist Progress Note    Daily Progress Note: 10/10/2021 5:14 PM      Dax Dillard                                            MRN: [id number]                                  :[id number]      Subjective: Into room to see patient due to patient wanting to leave since he was feeling slightly better, explained to the patient that we would like to keep him over night so he can see cardiologist in the am.     Patient alert and oriented is feeling better, other than mild abdominal pain. Patient denies headache and dizziness. Objective:     Visit Vitals  /77 (BP 1 Location: Right upper arm, BP Patient Position: At rest;Sitting)   Pulse 75   Temp 98.8 °F (37.1 °C)   Resp 16   Ht 5' 9\" (1.753 m)   Wt 89.8 kg (198 lb)   SpO2 96%   BMI 29.24 kg/m²      O2 Device: None (Room air)    Temp (24hrs), Av.9 °F (36.6 °C), Min:97.1 °F (36.2 °C), Max:98.8 °F (37.1 °C)      No intake/output data recorded. No intake/output data recorded. PHYSICAL EXAM:  Physical Exam  Vitals and nursing note reviewed. Constitutional:       Appearance: Normal appearance. She is normal weight. HENT:      Head: Normocephalic and atraumatic. Mouth/Throat:      Mouth: Mucous membranes are moist.   Eyes:      Extraocular Movements: Extraocular movements intact. Pupils: Pupils are equal, round, and reactive to light. Cardiovascular:      Rate and Rhythm: Normal rate and regular rhythm. Pulses: Normal pulses. Heart sounds: Normal heart sounds. Pulmonary:      Effort: Pulmonary effort is normal.      Breath sounds: Normal breath sounds. Abdominal:      General: Abdomen is flat. Bowel sounds are normal.      Palpations: Abdomen is soft. Musculoskeletal:      Cervical back: Normal range of motion and neck supple. Skin:     General: Skin is warm and dry. Capillary Refill: Capillary refill takes less than 2 seconds. Neurological:      General: No focal deficit present.       Mental Status: She is alert and oriented to person, place, and time. Psychiatric:         Mood and Affect: Mood normal.     Current Facility-Administered Medications   Medication Dose Route Frequency    sodium chloride (NS) flush 5-40 mL  5-40 mL IntraVENous Q8H    sodium chloride (NS) flush 5-40 mL  5-40 mL IntraVENous PRN    acetaminophen (TYLENOL) tablet 650 mg  650 mg Oral Q4H PRN    HYDROcodone-acetaminophen (NORCO) 5-325 mg per tablet 1 Tablet  1 Tablet Oral Q4H PRN    morphine injection 2 mg  2 mg IntraVENous Q4H PRN    docusate sodium (COLACE) capsule 100 mg  100 mg Oral BID    dulaglutide (TRULICITY) 1.5 NX/6.2 mL sub-q pen 1.5 mg - Non formulary, patient must supply (Patient Supplied)  1.5 mg SubCUTAneous Q7D    apixaban (ELIQUIS) tablet 5 mg  5 mg Oral BID    finasteride (PROSCAR) tablet 5 mg  5 mg Oral DAILY    latanoprost (XALATAN) 0.005 % ophthalmic solution 1 Drop  1 Drop Both Eyes DAILY    levETIRAcetam (KEPPRA) tablet 750 mg  750 mg Oral BID    metoprolol tartrate (LOPRESSOR) tablet 50 mg  50 mg Oral DAILY    pantoprazole (PROTONIX) tablet 40 mg  40 mg Oral ACB    pravastatin (PRAVACHOL) tablet 40 mg  40 mg Oral DAILY    lisinopriL (PRINIVIL, ZESTRIL) tablet 20 mg  20 mg Oral DAILY    metroNIDAZOLE (FLAGYL) tablet 500 mg  500 mg Oral Q12H        Assessment/Plan:     Syncope  -Echocardiogram in am  -carotid Dopplers in am  -orthostatic BP positvie  -continuous cardiac monitoring  - cardiologist consulted    Abdominal Pain  -CT abd: No abdominal or thoracic aortic aneurysm or dissection. Thickening of the colon beginning at the splenic flexure and extending through the left colon consistent with colitis.   -will cover patient with antibiotics (Flagyl)    Type 2 diabetes mellitus without complication (HCC)  -holding oral antidiabetics  -Insulin sliding scale  -Diabetic diet     Hypertension  -Blood pressure mildly elevated at 152/82 upon admission  -Continue active Accupril daily  -Continue to monitor     Atrial fibrillation (HCC)  -Rate controlled  -No acute symptoms  -Continue Eliquis    DVT Prophylaxis: eliquis    Code Status: Full    Care Plan discussed with: patient and nursing.     Signed by: ROSALIE Funes 10/10/2021

## 2021-10-10 NOTE — PROGRESS NOTES
Problem: Falls - Risk of  Goal: *Absence of Falls  Description: Document Karl Crystal Fall Risk and appropriate interventions in the flowsheet.   Outcome: Progressing Towards Goal  Note: Fall Risk Interventions:                                Problem: Patient Education: Go to Patient Education Activity  Goal: Patient/Family Education  Outcome: Progressing Towards Goal

## 2021-10-10 NOTE — H&P
History and Physical    Subjective:     Neil Boogie is a 79 y.o. male  has a past medical history of Arthritis, Cardiomegaly, Diabetes ([facility] [address] Essential hypertension, benign, Hyperchloremia, Hypercholesteremia, Hypercholesteremia, Hypertension, Hypertriglyceridemia, Kidney stone, Nonspecific abnormal electrocardiogram (ECG) (EKG), Nonspecific abnormal electrocardiogram (ECG) (EKG), Other and unspecified angina pectoris (10/17/2014), Pre-operative cardiovascular examination, Shortness of breath (10/17/2014), Type II or unspecified type diabetes mellitus without mention of complication, not stated as uncontrolled, and Type II or unspecified type diabetes mellitus without mention of complication, not stated as uncontrolled. Patient was preparing food and unexpectedly fell to the ground and was witnessed to be unconscious for about 30 seconds before he came to he did not appear to have any seizures or urinate or defecate on himself. He has had this before while having a bowel movement. He states that he periodically feels lightheaded. He denies any chest pain or shortness of breath. He did incidentally report having some back and shoulder pain that started prior to the syncopal episode. Chest/abdominal CT was performed without any acute findings. Patient is on Eliquis currently. Patient was evaluated in the emergency department and found to have A. fib on his EKG with multiple PVCs. Troponin was detectable but at 0.02 x 2. Abdominal CT did show colitis which should be monitored. Given patient's past medical history, he will be admitted for syncope work-up with expected length of stay of 2-3 midnight.       Past Medical History:   Diagnosis Date    Arthritis     Cardiomegaly     Diabetes ([facility] [address]     Essential hypertension, benign     Hyperchloremia     Hypercholesteremia     Hypercholesteremia     Hypertension     Hypertriglyceridemia     Kidney stone     Nonspecific abnormal electrocardiogram (ECG) (EKG)     JUNCTIONAL RHYTHM    Nonspecific abnormal electrocardiogram (ECG) (EKG)     JUNCTIONAL RHYTHM     Other and unspecified angina pectoris 10/17/2014    exertional angina stable better now     Pre-operative cardiovascular examination     Shortness of breath 10/17/2014    exertional hcvd     Type II or unspecified type diabetes mellitus without mention of complication, not stated as uncontrolled     Type II or unspecified type diabetes mellitus without mention of complication, not stated as uncontrolled       Past Surgical History:   Procedure Laterality Date    HX HERNIA REPAIR      HX ORTHOPAEDIC  5-8-2012    right knee replacement    HX POLYPECTOMY      HX TONSILLECTOMY      HX TOTAL COLECTOMY       History reviewed. No pertinent family history. Social History     Tobacco Use    Smoking status: Never Smoker    Smokeless tobacco: Never Used   Substance Use Topics    Alcohol use: No       Prior to Admission medications    Medication Sig Start Date End Date Taking? Authorizing Provider   dulaglutide (TRULICITY) 1.5 WX/8.7 mL sub-q pen 1.5 mg by SubCUTAneous route every seven (7) days. 7/6/20  Yes Provider, Historical   tamsulosin (FLOMAX) 0.4 mg capsule TAKE 1 CAPSULE DAILY 3/25/19  Yes Michael Jay MD   quinapril (ACCUPRIL) 20 mg tablet TAKE 1 TABLET DAILY 2/1/19  Yes Rossana Nguyen NP   metoprolol tartrate (LOPRESSOR) 100 mg IR tablet Take 50 mg by mouth daily. Yes Provider, Historical   ELIQUIS 5 mg tablet 2.5 mg two (2) times a day. 11/3/17  Yes Provider, Historical   levETIRAcetam (KEPPRA) 750 mg tablet 750 mg. 4/12/17  Yes Provider, Historical   finasteride (PROSCAR) 5 mg tablet Take 1 Tab by mouth daily. 9/5/17  Yes Michael Jay MD   omeprazole (PRILOSEC) 20 mg capsule Take 20 mg by mouth daily. Yes Provider, Historical   multivitamins-minerals-lutein (CENTRUM SILVER) Tab Take 1 Tab by mouth.      Yes Provider, Historical   glimepiride (AMARYL) 4 mg tablet Take 4 mg by mouth every morning. 1/2 tablet twice a day    Yes Provider, Historical   latanoprost (XALATAN) 0.005 % ophthalmic solution Administer 1 Drop to both eyes daily. Yes Provider, Historical   pravastatin (PRAVACHOL) 80 mg tablet Take 40 mg by mouth daily. Yes Provider, Historical   furosemide (LASIX) 40 mg tablet Take 1 Tab by mouth two (2) times a day. Patient not taking: Reported on 10/9/2021 1/7/19   Arnie Nguyen, KRUNAL   potassium chloride (K-DUR, KLOR-CON) 20 mEq tablet Take 1 Tab by mouth daily. Patient not taking: Reported on 10/9/2021 12/26/18   Alisha Ruiz NP   magnesium oxide (MAG-OX) 400 mg tablet Take 1 Tab by mouth two (2) times a day. Patient not taking: Reported on 10/9/2021 5/23/18   Thien Richardson MD   pirocin [facility]) 2 % ointment Apply  to affected area daily. Provider, Historical   mirabegron ER (MYRBETRIQ) 50 mg ER tablet Take 1 Tab by mouth daily. Patient not taking: Reported on 10/9/2021 1/8/18   Emilie Santos MD   sildenafil citrate (VIAGRA) 100 mg tablet Take 1 Tab by mouth daily as needed. 1/8/18   Deepika Booth MD   glucose blood VI test strips (ASCENSIA AUTODISC VI, ONE TOUCH ULTRA TEST VI) strip 100 Each. 5/23/16   Provider, Historical   aspirin 81 mg chewable tablet 81 mg. Patient not taking: Reported on 10/9/2021    Provider, Historical   pioglitazone (ACTOS) 30 mg tablet Take 30 mg by mouth daily. Patient not taking: Reported on 10/9/2021    Provider, Historical   metFORMIN (GLUCOPHAGE) 1,000 mg tablet Take 1,000 mg by mouth two (2) times daily (with meals).     Patient not taking: Reported on 10/9/2021    Provider, Historical     Allergies   Allergen Reactions    Aleve [Naproxen Sodium] Other (comments)     bleeding    Nsaids (Non-Steroidal Anti-Inflammatory Drug) Other (comments)     Risk for gastric erosions          REVIEW OF SYSTEMS:       Total of 12 systems reviewed as follows:       POSITIVE= underlined text  Negative = text not underlined  General:  fever, chills, sweats, generalized weakness, weight loss/gain,      loss of appetite   Eyes:    blurred vision, eye pain, loss of vision, double vision  ENT:    rhinorrhea, pharyngitis   Respiratory:  cough, sputum production, SOB, SMITH, wheezing, pleuritic pain   Cardiology:   chest pain, palpitations, orthopnea, PND, edema, syncope   Gastrointestinal:  abdominal pain , N/V, diarrhea, dysphagia, constipation, bleeding   Genitourinary:  frequency, urgency, dysuria, hematuria, incontinence   Muskuloskeletal :  arthralgia, myalgia, back pain  Hematology: easy bruising, nose or gum bleeding, lymphadenopathy   Dermatological: rash, ulceration, pruritis, color change / jaundice  Endocrine:   hot flashes or polydipsia   Neurological:  headache, dizziness, confusion, focal weakness, paresthesia,     Speech difficulties, memory loss, gait difficulty  Psychological: Feelings of anxiety, depression, agitation       Objective:   VITALS:    Visit Vitals  BP (!) 152/82   Pulse 81   Temp 98.3 °F (36.8 °C)   Resp 18   Ht 5' 9\" (1.753 m)   Wt 89.8 kg (198 lb)   SpO2 94%   BMI 29.24 kg/m²       PHYSICAL EXAM:    General:    Alert, cooperative, no distress, appears stated age. HEENT: Atraumatic, anicteric sclerae, pink conjunctivae     No oral ulcers, mucosa moist, throat clear, dentition fair  Neck:  Supple, symmetrical,  thyroid: non tender  Lungs:   Clear to auscultation bilaterally. No Wheezing or Rhonchi. No rales. Chest wall:  No tenderness  No Accessory muscle use. Heart:   Regular  rhythm,  No  murmur   No edema  Abdomen:   Soft, non-tender. Not distended. Bowel sounds normal  Extremities: No cyanosis. No clubbing,      Skin turgor normal, Capillary refill normal, Radial dial pulse 2+  Skin:     Not pale. Not Jaundiced  No rashes   Psych:  Good insight. Not depressed. Not anxious or agitated. Neurologic: EOMs intact. No facial asymmetry. No aphasia or slurred speech. Symmetrical strength,   Sensation grossly intact. Alert and oriented X 4. Given the patient's current clinical presentation, I have a high level of concern for decompensation if discharged from the emergency department. Complex decision making was performed, which includes reviewing the patient's available past medical records, laboratory results, and x-ray films.        _______________________________________________________________________  Care Plan discussed with:    Comments   Patient X    Family      RN X    Care Manager                    Consultant:      _______________________________________________________________________  Expected  Disposition:   Home with Family X   HH/PT/OT/RN    SNF/LTC    ARIELLE    ________________________________________________________________________  TOTAL TIME:  48 Minutes    Critical Care Provided     Minutes non procedure based      Comments    X Reviewed previous records   >50% of visit spent in counseling and coordination of care X Discussion with patient and/or family and questions answered       ________________________________________________________________________    Labs:  Recent Results (from the past 24 hour(s))   EKG, 12 LEAD, INITIAL    Collection Time: 10/09/21  8:53 PM   Result Value Ref Range    Ventricular Rate 88 BPM    Atrial Rate 0 BPM    P-R Interval 151 ms    QRS Duration 116 ms    Q-T Interval 394 ms    QTC Calculation (Bezet) 477 ms    Calculated R Axis 35 degrees    Calculated T Axis 3 degrees    Diagnosis       Atrial fibrillation  Nonspecific intraventricular conduction delay  Low voltage, extremity and precordial leads     CBC WITH AUTOMATED DIFF    Collection Time: 10/09/21  9:24 PM   Result Value Ref Range    WBC 13.2 4.6 - 13.2 K/uL    RBC 4.13 (L) 4.35 - 5.65 M/uL    HGB 14.6 13.0 - 16.0 g/dL    HCT 44.6 36.0 - 48.0 %    .0 (H) 78.0 - 100.0 FL    MCH 35.4 (H) 24.0 - 34.0 PG    MCHC 32.7 31.0 - 37.0 g/dL    RDW 13.2 11.6 - 14.5 % PLATELET 700 991 - 738 K/uL    MPV 10.5 9.2 - 11.8 FL    NRBC 0.0 0.0  WBC    ABSOLUTE NRBC 0.00 0.00 - 0.01 K/uL    NEUTROPHILS 86 (H) 40 - 73 %    LYMPHOCYTES 4 (L) 21 - 52 %    MONOCYTES 9 3 - 10 %    EOSINOPHILS 0 0 - 5 %    BASOPHILS 0 0 - 2 %    IMMATURE GRANULOCYTES 1 (H) 0 - 0.5 %    ABS. NEUTROPHILS 11.3 (H) 1.8 - 8.0 K/UL    ABS. LYMPHOCYTES 0.5 (L) 0.9 - 3.6 K/UL    ABS. MONOCYTES 1.2 0.05 - 1.2 K/UL    ABS. EOSINOPHILS 0.0 0.0 - 0.4 K/UL    ABS. BASOPHILS 0.0 0.0 - 0.1 K/UL    ABS. IMM. GRANS. 0.1 (H) 0.00 - 0.04 K/UL    DF AUTOMATED     PROTHROMBIN TIME + INR    Collection Time: 10/09/21  9:24 PM   Result Value Ref Range    Prothrombin time 14.3 11.5 - 15.2 sec    INR 1.2 0.8 - 1.2     METABOLIC PANEL, BASIC    Collection Time: 10/09/21  9:24 PM   Result Value Ref Range    Sodium 138 135 - 145 mmol/L    Potassium 3.8 3.2 - 5.1 mmol/L    Chloride 99 94 - 111 mmol/L    CO2 28 21 - 33 mmol/L    Anion gap 11 mmol/L    Glucose 229 (H) 70 - 110 mg/dL    BUN 23 (H) 9 - 21 mg/dL    Creatinine 1.50 0.8 - 1.50 mg/dL    BUN/Creatinine ratio 15      GFR est AA 54 ml/min/1.73m2    GFR est non-AA 45 ml/min/1.73m2    Calcium 9.8 8.5 - 10.5 mg/dL   TROPONIN I    Collection Time: 10/09/21  9:24 PM   Result Value Ref Range    Troponin-I, Qt. 0.02 0.02 - 0.05 ng/mL   MAGNESIUM    Collection Time: 10/09/21  9:24 PM   Result Value Ref Range    Magnesium 1.9 1.7 - 2.8 mg/dL   HEPATIC FUNCTION PANEL    Collection Time: 10/09/21  9:24 PM   Result Value Ref Range    Protein, total 7.7 6.1 - 8.4 g/dL    Albumin 4.0 3.5 - 4.7 g/dL    Globulin 3.7 g/dL    A-G Ratio 1.1      Bilirubin, total 1.2 (H) 0.2 - 1.0 mg/dL    Bilirubin, direct 0.1 0.0 - 0.3 mg/dL    Alk.  phosphatase 80 38 - 126 U/L    AST (SGOT) 29 17 - 74 U/L    ALT (SGPT) 22 3 - 72 U/L   LIPASE    Collection Time: 10/09/21  9:24 PM   Result Value Ref Range    Lipase 26 10 - 57 U/L   GLUCOSE, POC    Collection Time: 10/09/21  9:41 PM   Result Value Ref Range Glucose (POC) 218 (H) 70 - 110 mg/dL    Performed by [employer]        Imaging:  XR CHEST PA LAT    Result Date: 10/9/2021  MEDICAL RECORDS NUMBER: [id number] PROCEDURE 2 views of the chest DATE: 10/9/2021 10:13 PM HISTORY: 79years old Male.  syncope COMPARISON: None FINDINGS: There is no significant effusion. There is no significant pneumothorax. Mild cardiomegaly. There is no evidence of a focal pulmonary infiltrate or mass. 1. There is no evidence of a significant or acute cardiopulmonary process. This report has been generated using voice recognition software. CT HEAD WO CONT    Result Date: 10/10/2021  EXAM: CT head INDICATION: Syncope. COMPARISON: None. TECHNIQUE: Axial CT imaging of the head was performed without intravenous contrast. Dose reduction techniques used: automated exposure control, adjustment of the mAs and/or kVp according to patient size, and iterative reconstruction techniques. Digital imaging and communications in Medicine (DICOM) format image data are available to nonaffiliated external healthcare facilities or entities on a secure, media free, reciprocally searchable basis with patient authorization for at least 12 months after this study. _______________ FINDINGS: BRAIN AND POSTERIOR FOSSA: There is cerebral volume loss with prominence of the lateral and the third ventricles. The cortical sulci are widened appropriately. The fourth ventricle and basal cisterns are normally outlined. There is right temporal hypodensity without mass effect. There is no acute territorial defect, hemorrhage or midline shift. EXTRA-AXIAL SPACES AND MENINGES: There are no abnormal extra-axial fluid collections. CALVARIUM: Intact. SINUSES: Clear. OTHER: None. _______________     No acute intracranial abnormalities. 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     CTA CHEST ABD PELV W WO CONT    Result Date: 10/10/2021  --------------------------------------------------------------------------- <<<<<<<<<           [facility]           >>>>>>>>> --------------------------------------------------------------------------- CLINICAL HISTORY:   Syncope. Back pain. Shoulder pain. Abdominal pain. Concern for dissection. COMPARISON EXAMINATIONS:   None. TECHNIQUE:   CT angiogram of the chest, abdomen, and pelvis. Coronal/sagittal and MIP reconstructions were generated. One or more dose reduction techniques were used on this CT: automated exposure control, adjustment of the mAs and/or kVp according to patient size, and iterative reconstruction techniques. The specific techniques used on this CT exam have been documented in the patient's electronic medical record. Digital imaging and communications in Medicine (DICOM) format image data are available to nonaffiliated external healthcare facilities or entities on a secure, media free, reciprocally searchable basis with patient authorization for at least 12 months after this study. --------------- CT angiogram --------------- ---  AORTA --- THORACIC: There is mild atherosclerotic plaque of the descending thoracic aorta. No dissection or aneurysm. ABDOMINAL: There is atherosclerotic plaque of the abdominal aorta and proximal branches. No aneurysm. --- ILIAC ARTERIES --- RIGHT COMMON ILIAC: Patent LEFT COMMON ILIAC: Patent RIGHT INTERNAL ILIAC: Patent LEFT INTERNAL ILIAC: Patent RIGHT EXTERNAL ILIAC: Patent LEFT EXTERNAL ILIAC: Patent --- MAJOR VISCERAL ARTERIES --- CELIAC = patent  SMA = patent  JACKELINE = patent  LEFT RENAL = patent  RIGHT RENAL = patent ---------- CT CHEST ---------- LUNG PARENCHYMA:   Unremarkable in appearance. PLEURAL SPACES:   No fluid or pleural thickening. MEDIASTINUM:   The heart is enlarged.  OSSEOUS STRUCTURES, CHEST WALL:   Mild thoracic spondylosis. -------------- CT ABDOMEN -------------- SOLID ABDOMINAL VISCERA: The liver, spleen, pancreas, and  adrenal glands are normal in appearance. There is a 6.4 cm cyst lower pole of the right kidney. There is no hydronephrosis. Gallstones are noted. RETROPERITONEUM:   No significantly enlarged retroperitoneal lymph nodes. OTHER:   No ascites or free intraperitoneal air. ----------- CT PELVIS ----------- RETROPERITONEUM:   No enlarged lymph nodes or retroperitoneal mass. BOWEL:   There are diverticula of the transverse, descending and sigmoid colon. There is moderate colonic thickening beginning at the splenic flexure of colon extending through the left colon to the distal left colon. . OTHER:   No pelvic fluid. No pelvic mass. OSSEOUS STRUCTURES:   Mild hip arthropathy. Mild lumbar spondylosis. --------------    -------------- No abdominal or thoracic aortic aneurysm or dissection. Thickening of the colon beginning at the splenic flexure and extending through the left colon consistent with colitis. Transverse, descending and sigmoid colon diverticulosis without definitive evidence for diverticulitis. Cholelithiasis.         Assessment & Plan:       Syncope  -Echocardiogram, carotid Dopplers  -Lipid panel, TSH  -Orthostatic Bps  -Telemetry    Type 2 diabetes mellitus without complication (HCC)  -Hold oral agents  -Insulin sliding scale  -Diabetic diet    Hypertension  -Blood pressure mildly elevated at 152/82 upon admission  -Continue active Accupril daily  -Continue to monitor    Atrial fibrillation (HCC)  -Rate controlled  -No acute symptoms  -Continue Eliquis  -Telemetry        Code Status: Full    Prophylaxis: Continue Eliquis    Electronically Signed : Marilyn Moy, PhD, PA-C, Hospital Medicine Service

## 2021-10-10 NOTE — ED PROVIDER NOTES
HPI   Patient is an 69-year-old male who presents after a syncopal episode. Patient was cutting potatoes when he passed out and fell to the ground. This was witnessed by bystanders. No seizure-like activity was noted. Patient was unconscious for approximately 30 seconds before he came to. He did defecate on himself. He was given aspirin by bystanders. He states that before this he was in the bathroom straining to have a bowel movement but was unable to. He states that he intermittently feels lightheaded but does not now. He denies any chest pain or difficulty breathing however starting around 6:00 today he did have some back and shoulder pain that started prior to his syncopal episode. He denies any headache or head injury. He is on Eliquis. He denies any current neck pain. He does states he was having some abdominal pain earlier today but this has since resolved. He has been constipated recently but denies any blood in his stool. Denies any nausea or vomiting. He states that he is chronically in atrial fibrillation this is not abnormal for him.   Past Medical History:   Diagnosis Date    Arthritis     Cardiomegaly     Diabetes ([address]     Essential hypertension, benign     Hyperchloremia     Hypercholesteremia     Hypercholesteremia     Hypertension     Hypertriglyceridemia     Kidney stone     Nonspecific abnormal electrocardiogram (ECG) (EKG)     JUNCTIONAL RHYTHM    Nonspecific abnormal electrocardiogram (ECG) (EKG)     JUNCTIONAL RHYTHM     Other and unspecified angina pectoris 10/17/2014    exertional angina stable better now     Pre-operative cardiovascular examination     Shortness of breath 10/17/2014    exertional hcvd     Type II or unspecified type diabetes mellitus without mention of complication, not stated as uncontrolled     Type II or unspecified type diabetes mellitus without mention of complication, not stated as uncontrolled        Past Surgical History:   Procedure Laterality Date    HX HERNIA REPAIR      HX ORTHOPAEDIC  5-8-2012    right knee replacement    HX POLYPECTOMY      HX TONSILLECTOMY      HX TOTAL COLECTOMY           History reviewed. No pertinent family history. Social History     Socioeconomic History    Marital status:      Spouse name: Not on file    Number of children: Not on file    Years of education: Not on file    Highest education level: Not on file   Occupational History    Not on file   Tobacco Use    Smoking status: Never Smoker    Smokeless tobacco: Never Used   Vaping Use    Vaping Use: Never used   Substance and Sexual Activity    Alcohol use: No    Drug use: No    Sexual activity: Not on file   Other Topics Concern    Not on file   Social History Narrative    Not on file     Social Determinants of Health     Financial Resource Strain:     Difficulty of Paying Living Expenses:    Food Insecurity:     Worried About Running Out of Food in the Last Year:     [address] in the Last Year:    Transportation Needs:     Lack of Transportation (Medical):  Lack of Transportation (Non-Medical):    Physical Activity:     Days of Exercise per Week:     Minutes of Exercise per Session:    Stress:     Feeling of Stress :    Social Connections:     Frequency of Communication with Friends and Family:     Frequency of Social Gatherings with Friends and Family:     Attends Jain Services:     Active Member of Clubs or Organizations:     Attends Club or Organization Meetings:     Marital Status:    Intimate Partner Violence:     Fear of Current or Ex-Partner:     Emotionally Abused:     Physically Abused:     Sexually Abused:           ALLERGIES: Aleve [naproxen sodium] and Nsaids (non-steroidal anti-inflammatory drug)    Review of Systems  Constitutional: No fever  HENT: No ear pain  Eyes: No change in vision  Respiratory: No SOB  Cardio: No chest pain  GI: No blood in stool  : No hematuria  MSK: No back pain  Skin: No rashes  Neuro: No headache    Vitals:    10/09/21 2220 10/09/21 2340 10/10/21 0040 10/10/21 0139   BP: 133/73 (!) 150/95 (!) 149/91 (!) 152/82   Pulse: 78 82 74 81   Resp: 20 16 19 18   Temp:       SpO2: 99% 98% 98% 94%   Weight:       Height:                Physical Exam   General: No acute distress  Head: Normocephalic, atraumatic  Psych: Cooperative and alert  Eyes: No scleral icterus, normal conjunctiva  ENT: Moist oral mucosa  Neck: Supple, no carotid bruit  CV: Irregularly irregular rate, no pitting edema, palpable radial and DP pulses bilaterally  Pulm: Clear breath sounds bilaterally without any wheezing or rhonchi, normal respiratory rate  GI: Normal bowel sounds, soft, non-tender  MSK: Moves all four extremities  Skin: No rashes  Neuro: Face is symmetrical, vision and hearing grossly intact, normal speech, 5/5 strength bilateral upper and lower extremities, no pronator drift, finger-to-nose cerebellar testing is within normal limits, no extinction, sensation grossly intact        MDM   Patient is an 79-year-old male who presents with syncope versus seizure. Since no seizure-like activity was seen it was most likely syncope however he did have loss of control of defecation and does have a history of seizures on Keppra. However he states he is compliant. He does state that he has a history of a bleed in his brain and with a seizure would like to obtain a head CT 8 to make sure there is no acute trauma especially considering the fact he is on Eliquis and to look for any worsening of his chronic bleed. As for his possible syncope he does have multiple cardiac etiologies including the fact that he is in atrial fibrillation currently. We will do a cardiac work-up. Also considering his back and shoulder pain and earlier abdominal pain although he does not have significant risk factors for this I would like to rule out dissection as an etiology for his syncope.   Because this we will add on a CTA.    EKG shows an irregularly irregular rhythm consistent with atrial fibrillation. There is multiple PVCs seen. There is 1 PAC seen. Axis is normal, QRS is narrow, there is low voltage throughout. R wave progression is appropriate. No specific ST elevations or depressions noted. Overall shows atrial fibrillation with PVCs. No signs of ACS. CBC shows a mild leukocytosis, otherwise unremarkable. INR, BMP and hepatic panel are within normal limits with exception of a mild elevation in patient's total bilirubin. Troponin is detectable at 0.02. BUN and creatinine are double the patient's baseline consistent with an acute kidney injury. Head CT shows no acute intracranial process. CTA of the chest abdomen and pelvis shows no aortic abnormalities. There is thickening of the colon beginning at the splenic flexure and extending through the left colon consistent with colitis. But no evidence of diverticulitis. On examination patient does state that he had a bout of diarrhea while waiting here in the emergency department but otherwise has not had any vomiting. He did not notice any blood in his stool. We advised him that his CT did show colitis and to look out for worsening diarrhea however this point not think he requires any treatment. Repeat troponin is less than 0.02. Is mildly changed from previous. With patient's history of cardiac issues, ADAM and syncope I do feel that he would benefit from a stay in observation. Patient is reluctant but in agreement with this plan. Patient is admitted to the hospitalist service. Patient is in agreement with the plan to be admitted at this time.   All orders moving forward replacement the admitting team.    ]  Procedures

## 2021-10-10 NOTE — PROGRESS NOTES
Orthostatic Vital Signs   10/10/21 1249   Vital Signs   BP (!) 155/60   MAP (Calculated) 92   BP Patient Position Lying;Supine   More BP/Pulse rows needed?  Yes   Additional Blood Pressure/Pulse Data   BP 2 98/53   MAP 2 (Calculated) 68   Patient Position 2 Sitting   BP 3 114/54   MAP 3 (Calculated) 74   Patient Position 3 Standing

## 2021-10-10 NOTE — ASSESSMENT & PLAN NOTE
-Blood pressure mildly elevated at 152/82 upon admission  -Continue active Accupril daily  -Continue to monitor

## 2021-10-11 ENCOUNTER — APPOINTMENT (OUTPATIENT)
Dept: NON INVASIVE DIAGNOSTICS | Age: 81
End: 2021-10-11
Attending: PHYSICIAN ASSISTANT
Payer: MEDICARE

## 2021-10-11 ENCOUNTER — APPOINTMENT (OUTPATIENT)
Dept: VASCULAR SURGERY | Age: 81
End: 2021-10-11
Attending: PHYSICIAN ASSISTANT
Payer: MEDICARE

## 2021-10-11 VITALS
DIASTOLIC BLOOD PRESSURE: 56 MMHG | WEIGHT: 206 LBS | HEIGHT: 69 IN | BODY MASS INDEX: 30.51 KG/M2 | RESPIRATION RATE: 18 BRPM | HEART RATE: 64 BPM | SYSTOLIC BLOOD PRESSURE: 104 MMHG | OXYGEN SATURATION: 94 % | TEMPERATURE: 97.4 F

## 2021-10-11 LAB
ANION GAP SERPL CALC-SCNC: 11 MMOL/L
BASOPHILS # BLD: 0 K/UL (ref 0–0.1)
BASOPHILS NFR BLD: 0 % (ref 0–2)
BUN SERPL-MCNC: 16 MG/DL (ref 9–21)
BUN/CREAT SERPL: 18
CA-I BLD-MCNC: 9.2 MG/DL (ref 8.5–10.5)
CHLORIDE SERPL-SCNC: 104 MMOL/L (ref 94–111)
CO2 SERPL-SCNC: 26 MMOL/L (ref 21–33)
CREAT SERPL-MCNC: 0.9 MG/DL (ref 0.8–1.5)
DIFFERENTIAL METHOD BLD: ABNORMAL
ECHO AO ASC DIAM: 4.1 CM
ECHO AO ROOT DIAM: 3.9 CM
ECHO AV AREA PEAK VELOCITY: 3.52 CM2
ECHO AV AREA PLAN/BSA: 1.51
ECHO AV AREA VTI: 3.16 CM2
ECHO AV AREA/BSA PEAK VELOCITY: 1.7 CM2/M2
ECHO AV AREA/BSA VTI: 1.5 CM2/M2
ECHO AV CUSP MM: 1.8 CM
ECHO AV MEAN GRADIENT: 6 MMHG
ECHO AV MEAN VELOCITY: 111 CM/S
ECHO AV PEAK GRADIENT: 11 MMHG
ECHO AV PEAK VELOCITY: 163 CM/S
ECHO AV VTI: 31.4 CM
ECHO EST RA PRESSURE: 3 MMHG
ECHO LA AREA 2C: 26.2 CM2
ECHO LA AREA 4C: 26.6 CM2
ECHO LA MAJOR AXIS: 5 CM
ECHO LA MAJOR AXIS: 6.42 CM
ECHO LA TO AORTIC ROOT RATIO: 1.28
ECHO LV EDV A2C: 104 CM3
ECHO LV EDV A2C: 68.3 CM3
ECHO LV EDV A4C: 50.8 CM3
ECHO LV EJECTION FRACTION BIPLANE: 46.7 % (ref 55–100)
ECHO LV ESV A2C: 46.7 CM3
ECHO LV ESV A4C: 27.5 CM3
ECHO LV INTERNAL DIMENSION DIASTOLIC: 4.7 CM (ref 4.2–5.9)
ECHO LV INTERNAL DIMENSION SYSTOLIC: 3.6 CM
ECHO LV IVSD: 1 CM (ref 0.6–1)
ECHO LV MASS 2D: 164.5 G (ref 88–224)
ECHO LV MASS INDEX 2D: 78.7 G/M2 (ref 49–115)
ECHO LV POSTERIOR WALL DIASTOLIC: 1 CM (ref 0.6–1)
ECHO LVOT DIAM: 2.5 CM
ECHO LVOT PEAK GRADIENT: 5 MMHG
ECHO LVOT PEAK VELOCITY: 117 CM/S
ECHO LVOT SV: 99 CM3
ECHO LVOT VTI: 18.7 CM
ECHO LVOT VTI: 18.8 CM
ECHO LVOT VTI: 18.9 CM
ECHO LVOT VTI: 19.2 CM
ECHO LVOT VTI: 20.2 CM
ECHO LVOT VTI: 25.2 CM
ECHO RA AREA 4C: 20.4 CM2
ECHO RA MAJOR AXIS: 5.89 CM
ECHO RA MINOR AXIS: 4.9 CM
ECHO RIGHT VENTRICULAR SYSTOLIC PRESSURE (RVSP): 41 MMHG
ECHO RV INTERNAL DIMENSION: 3.5 CM
ECHO RV TAPSE: 1.68 CM (ref 1.5–2)
ECHO TV MEAN GRADIENT: 38 MMHG
ECHO TV REGURGITANT MAX VELOCITY: 309 CM/S
EOSINOPHIL # BLD: 0.1 K/UL (ref 0–0.4)
EOSINOPHIL NFR BLD: 1 % (ref 0–5)
ERYTHROCYTE [DISTWIDTH] IN BLOOD BY AUTOMATED COUNT: 13.4 % (ref 11.6–14.5)
GLUCOSE SERPL-MCNC: 126 MG/DL (ref 70–110)
HCT VFR BLD AUTO: 39.5 % (ref 36–48)
HGB BLD-MCNC: 13 G/DL (ref 13–16)
IMM GRANULOCYTES # BLD AUTO: 0 K/UL (ref 0–0.04)
IMM GRANULOCYTES NFR BLD AUTO: 0 % (ref 0–0.5)
LA VOL DISK BP: 89.5 CM3 (ref 18–58)
LVOT MG: 3 MMHG
LYMPHOCYTES # BLD: 1.1 K/UL (ref 0.9–3.6)
LYMPHOCYTES NFR BLD: 10 % (ref 21–52)
MCH RBC QN AUTO: 34.9 PG (ref 24–34)
MCHC RBC AUTO-ENTMCNC: 32.9 G/DL (ref 31–37)
MCV RBC AUTO: 105.9 FL (ref 78–100)
MONOCYTES # BLD: 1.3 K/UL (ref 0.05–1.2)
MONOCYTES NFR BLD: 12 % (ref 3–10)
NEUTS SEG # BLD: 8.5 K/UL (ref 1.8–8)
NEUTS SEG NFR BLD: 77 % (ref 40–73)
NRBC # BLD: 0 K/UL (ref 0–0.01)
NRBC BLD-RTO: 0 PER 100 WBC
PLATELET # BLD AUTO: 147 K/UL (ref 135–420)
PMV BLD AUTO: 10.8 FL (ref 9.2–11.8)
POTASSIUM SERPL-SCNC: 3.8 MMOL/L (ref 3.2–5.1)
RBC # BLD AUTO: 3.73 M/UL (ref 4.35–5.65)
SODIUM SERPL-SCNC: 141 MMOL/L (ref 135–145)
WBC # BLD AUTO: 11 K/UL (ref 4.6–13.2)

## 2021-10-11 PROCEDURE — 74011250637 HC RX REV CODE- 250/637: Performed by: PHYSICIAN ASSISTANT

## 2021-10-11 PROCEDURE — 85025 COMPLETE CBC W/AUTO DIFF WBC: CPT

## 2021-10-11 PROCEDURE — 74011000250 HC RX REV CODE- 250: Performed by: PHYSICIAN ASSISTANT

## 2021-10-11 PROCEDURE — 74011250637 HC RX REV CODE- 250/637: Performed by: NURSE PRACTITIONER

## 2021-10-11 PROCEDURE — 99218 HC RM OBSERVATION: CPT

## 2021-10-11 PROCEDURE — 96374 THER/PROPH/DIAG INJ IV PUSH: CPT

## 2021-10-11 PROCEDURE — 80048 BASIC METABOLIC PNL TOTAL CA: CPT

## 2021-10-11 PROCEDURE — 36415 COLL VENOUS BLD VENIPUNCTURE: CPT

## 2021-10-11 RX ORDER — METRONIDAZOLE 500 MG/1
500 TABLET ORAL EVERY 12 HOURS
Qty: 8 TABLET | Refills: 0 | Status: SHIPPED | OUTPATIENT
Start: 2021-10-11 | End: 2021-10-15

## 2021-10-11 RX ORDER — METOPROLOL TARTRATE 50 MG/1
50 TABLET ORAL DAILY
Qty: 30 TABLET | Refills: 0 | Status: SHIPPED | OUTPATIENT
Start: 2021-10-12 | End: 2021-11-04 | Stop reason: SDUPTHER

## 2021-10-11 RX ADMIN — METOPROLOL TARTRATE 50 MG: 50 TABLET, FILM COATED ORAL at 08:00

## 2021-10-11 RX ADMIN — PRAVASTATIN SODIUM 40 MG: 20 TABLET ORAL at 08:01

## 2021-10-11 RX ADMIN — METRONIDAZOLE 500 MG: 250 TABLET ORAL at 08:00

## 2021-10-11 RX ADMIN — LEVETIRACETAM 750 MG: 250 TABLET, FILM COATED ORAL at 08:00

## 2021-10-11 RX ADMIN — DOCUSATE SODIUM 100 MG: 100 CAPSULE ORAL at 08:00

## 2021-10-11 RX ADMIN — LATANOPROST 1 DROP: 50 SOLUTION OPHTHALMIC at 08:07

## 2021-10-11 RX ADMIN — APIXABAN 5 MG: 5 TABLET, FILM COATED ORAL at 08:00

## 2021-10-11 RX ADMIN — FINASTERIDE 5 MG: 5 TABLET, FILM COATED ORAL at 08:00

## 2021-10-11 RX ADMIN — Medication 10 ML: at 14:22

## 2021-10-11 RX ADMIN — PANTOPRAZOLE SODIUM 40 MG: 40 TABLET, DELAYED RELEASE ORAL at 08:01

## 2021-10-11 RX ADMIN — LISINOPRIL 20 MG: 20 TABLET ORAL at 08:00

## 2021-10-11 RX ADMIN — Medication 10 ML: at 05:28

## 2021-10-11 NOTE — PROGRESS NOTES
Patient in bed sleeping. Patient woke easily. Assessment completed. No needs noted at this time.  Will continue to monitor

## 2021-10-11 NOTE — CONSULTS
[facility] CARDIOLOGY  CARDIOLOGY CONSULTATION    REASON FOR CONSULT: Syncope    REQUESTING PROVIDER: FUENTES Joyce    CHIEF COMPLAINT: Syncope    HISTORY OF PRESENT ILLNESS:  Carmella Luna is a 79y.o. year-old male with past medical history significant for afib, diabetes, HTN, hyperlipidemia, and seizures who was seen today for evaluation of syncope. The patient presented to the [facility] ER on 10/9/21. He was at the [place] and was constipated. He states he strained to have a bowel movement for about 30 minutes, then had the syncopal episode. It was witnessed by bystanders who state he was unconscious for about 30 seconds. After he aroused, he was incontinent of a large amount of loose stool and his abdominal pain resolved. He denies any lightheadedness or dizziness prior to the episode. He denies any prior syncopal events. Records from hospital admission course thus far reviewed. Telemetry reviewed. No events overnight. The patient is in atrial fibrillation with occasional PVCs. INPATIENT MEDICATIONS:  Home medications reviewed.     Current Facility-Administered Medications:     sodium chloride (NS) flush 5-40 mL, 5-40 mL, IntraVENous, Q8H, FUENTES Laguerre, 10 mL at 10/11/21 0528    sodium chloride (NS) flush 5-40 mL, 5-40 mL, IntraVENous, PRN, FUENTES Huerta    acetaminophen (TYLENOL) tablet 650 mg, 650 mg, Oral, Q4H PRN, FUENTES Huerta    HYDROcodone-acetaminophen (NORCO) 5-325 mg per tablet 1 Tablet, 1 Tablet, Oral, Q4H PRN, FUENTES Huerta    morphine injection 2 mg, 2 mg, IntraVENous, Q4H PRN, FUENTES Huerta    docusate sodium (COLACE) capsule 100 mg, 100 mg, Oral, BID, FUENTES Huerta, 100 mg at 10/11/21 0800    dulaglutide (TRULICITY) 1.5 XM/2.9 mL sub-q pen 1.5 mg - Non formulary, patient must supply (Patient Supplied), 1.5 mg, SubCUTAneous, Q7D, Daniel Goldman PA    apixaban (ELIQUIS) tablet 5 mg, 5 mg, Oral, BID, FUENTES Huerta, 5 mg at 10/11/21 0800    finasteride (PROSCAR) tablet 5 mg, 5 mg, Oral, DAILY, Sanjiv Has, PA, 5 mg at 10/11/21 0800    latanoprost (XALATAN) 0.005 % ophthalmic solution 1 Drop, 1 Drop, Both Eyes, DAILY, Sanjiv Has, [place], 1 Drop at 10/11/21 0807    levETIRAcetam (KEPPRA) tablet 750 mg, 750 mg, Oral, BID, Sanjiv Has, PA, 750 mg at 10/11/21 0800    metoprolol tartrate (LOPRESSOR) tablet 50 mg, 50 mg, Oral, DAILY, Sanjiv Has, PA, 50 mg at 10/11/21 0800    pantoprazole (PROTONIX) tablet 40 mg, 40 mg, Oral, ACB, Sanjiv Has, PA, 40 mg at 10/11/21 0801    pravastatin (PRAVACHOL) tablet 40 mg, 40 mg, Oral, DAILY, Sanjiv Has, PA, 40 mg at 10/11/21 0801    lisinopriL (PRINIVIL, ZESTRIL) tablet 20 mg, 20 mg, Oral, DAILY, Sanjiv Has, PA, 20 mg at 10/11/21 0800    metroNIDAZOLE (FLAGYL) tablet 500 mg, 500 mg, Oral, Q12H, Analilia Butler S, ACNP, 500 mg at 10/11/21 0800     ALLERGIES:  Allergies reviewed with the patient,  Allergies   Allergen Reactions    Aleve [Naproxen Sodium] Other (comments)     bleeding    Nsaids (Non-Steroidal Anti-Inflammatory Drug) Other (comments)     Risk for gastric erosions      FAMILY HISTORY:  Family history reviewed. Mother had several myocardial infarctions, brother had MI and valvular heart disease. SOCIAL HISTORY:  Notable for no tobacco use, no alcohol use, and no illicit drug use. REVIEW OF SYSTEMS:  Complete review of systems performed, pertinents noted above, all other systems are negative. PHYSICAL EXAMINATION:  Vital sign assessment reveal a blood pressure of 130/83 and pulse rate of 72. Cardiovascular exam has a heart with an irregularly irregular rhythm, normal S1 and S2. No murmur present. There are no rubs or gallops. Good peripheral pulses. No jugular venous distension. Respiratory exam reveals clear lung fields, no rales or rhonchi. Lymphatic exam reveals no edema. Neurologic exam is nonfocal.      Recent labs results and imaging reviewed. Notable findings include:   Recent Results (from the past 24 hour(s))   CBC WITH AUTOMATED DIFF    Collection Time: 10/11/21  3:55 AM   Result Value Ref Range    WBC 11.0 4.6 - 13.2 K/uL    RBC 3.73 (L) 4.35 - 5.65 M/uL    HGB 13.0 13.0 - 16.0 g/dL    HCT 39.5 36.0 - 48.0 %    .9 (H) 78.0 - 100.0 FL    MCH 34.9 (H) 24.0 - 34.0 PG    MCHC 32.9 31.0 - 37.0 g/dL    RDW 13.4 11.6 - 14.5 %    PLATELET 447 852 - 708 K/uL    MPV 10.8 9.2 - 11.8 FL    NRBC 0.0 0.0  WBC    ABSOLUTE NRBC 0.00 0.00 - 0.01 K/uL    NEUTROPHILS 77 (H) 40 - 73 %    LYMPHOCYTES 10 (L) 21 - 52 %    MONOCYTES 12 (H) 3 - 10 %    EOSINOPHILS 1 0 - 5 %    BASOPHILS 0 0 - 2 %    IMMATURE GRANULOCYTES 0 0 - 0.5 %    ABS. NEUTROPHILS 8.5 (H) 1.8 - 8.0 K/UL    ABS. LYMPHOCYTES 1.1 0.9 - 3.6 K/UL    ABS. MONOCYTES 1.3 (H) 0.05 - 1.2 K/UL    ABS. EOSINOPHILS 0.1 0.0 - 0.4 K/UL    ABS. BASOPHILS 0.0 0.0 - 0.1 K/UL    ABS. IMM. GRANS. 0.0 0.00 - 0.04 K/UL    DF AUTOMATED     METABOLIC PANEL, BASIC    Collection Time: 10/11/21  3:55 AM   Result Value Ref Range    Sodium 141 135 - 145 mmol/L    Potassium 3.8 3.2 - 5.1 mmol/L    Chloride 104 94 - 111 mmol/L    CO2 26 21 - 33 mmol/L    Anion gap 11 mmol/L    Glucose 126 (H) 70 - 110 mg/dL    BUN 16 9 - 21 mg/dL    Creatinine 0.90 0.8 - 1.50 mg/dL    BUN/Creatinine ratio 18      GFR est AA >60 ml/min/1.73m2    GFR est non-AA >60 ml/min/1.73m2    Calcium 9.2 8.5 - 10.5 mg/dL     Discussed case with Dr. Derik Valentine, and our impression and recommendations are as follows:  1. Syncope: Troponins are negative x 2. EKGs are nonischemic and show no pauses or blocks. His orthostatics were positive yesterday, but improved today. His episode was likely due to dehydration vs straining during bowel movement. Will obtain echocardiogram to assess the aortic valve and overall cardiac structure and function. He will need outpatient event monitor. He will need 2 week follow up at [address] [place] [address] Cardiology with Dr. Brian Arteaga. 2. Atrial fibrillation: Rate is controlled. Continue metoprolol. Continue Eliquis for anticoagulation. Keep serum potassium between 4-5 and serum magnesium > 2.  3. HTN: Blood pressure is at goal this morning. Orthostasis has resolved. Thank you for involving us in the care of this patient. Please do not hesitate to call me or Dr. William Constantino if additional questions arise.

## 2021-10-11 NOTE — DISCHARGE SUMMARY
Hospitalist Discharge Summary     Patient ID:    Salma Heart  [id number]  61 y.o.  1940    Admit date: 10/9/2021    Discharge date : 10/11/2021    Chronic Diagnoses:    Problem List as of 10/11/2021 Date Reviewed: 10/10/2021        Codes Class Noted - Resolved    * (Principal) Syncope ICD-10-CM: R55  ICD-9-CM: 780.2  10/10/2021 - Present        Severe obesity ([facility] ICD-10-CM: E66.01  ICD-9-CM: 278.01  11/7/2018 - Present        Chronic diastolic congestive heart failure ([facility] ICD-10-CM: I50.32  ICD-9-CM: 428.32, 428.0  2/21/2018 - Present        GI bleed ICD-10-CM: K92.2  ICD-9-CM: 578.9  10/18/2016 - Present        Type 2 diabetes mellitus without complication ([facility]-EE: E11.9  ICD-9-CM: 250.00  9/14/2016 - Present        Subdural hematoma ([facility] ICD-10-CM: N72.2X7D  ICD-9-CM: 432.1  9/14/2016 - Present        Chest pain ICD-10-CM: R07.9  ICD-9-CM: 786.50  9/14/2016 - Present        Seizure disorder ([facility] ICD-10-CM: G40.909  ICD-9-CM: 345.90  9/14/2016 - Present        Atrial fibrillation ([facility] ICD-10-CM: I48.91  ICD-9-CM: 427.31  9/14/2016 - Present        Other and unspecified angina pectoris ICD-10-CM: I20.9  ICD-9-CM: 413.9  10/17/2014 - Present    Overview Signed 10/17/2014 10:52 AM by Ceci Thompson MD     exertional angina  stable  better now             Shortness of breath ICD-10-CM: R06.02  ICD-9-CM: 786.05  10/17/2014 - Present    Overview Signed 10/17/2014 10:52 AM by Ceci Thompson MD     exertional  hcvd             Arthritis ICD-10-CM: M19.90  ICD-9-CM: 716.90  Unknown - Present        Diabetes ([facility] ICD-10-CM: E11.9  ICD-9-CM: 250.00  Unknown - Present        Hypertension ICD-10-CM: I10  ICD-9-CM: 401.9  Unknown - Present        Hypercholesteremia ICD-10-CM: E78.00  ICD-9-CM: 272.0  Unknown - Present        Hypertriglyceridemia ICD-10-CM: E78.1  ICD-9-CM: 272.1  Unknown - Present        Kidney stone ICD-10-CM: N20.0  ICD-9-CM: 592.0  Unknown - Present Overview Signed 8/20/2014 10:35 AM by Katlin Estrada MD     Left UVJ 4mm seen 7/2014 CT at [facility] ED. Not retrieved. Family hx positive, brother. Cardiomegaly ICD-10-CM: I51.7  ICD-9-CM: 429.3  Unknown - Present        Nonspecific abnormal electrocardiogram (ECG) (EKG) ICD-10-CM: R94.31  ICD-9-CM: 794.31  Unknown - Present    Overview Signed 6/5/2013  7:39 PM by Verito Ni RHYTHM             Type II or unspecified type diabetes mellitus without mention of complication, not stated as uncontrolled ICD-10-CM: E11.9  ICD-9-CM: 250.00  Unknown - Present        Hyperchloremia ICD-10-CM: E87.8  ICD-9-CM: 276.9  Unknown - Present        RESOLVED: Essential hypertension, benign ICD-10-CM: I10  ICD-9-CM: 401.1  8/3/2021 - 8/3/2021        RESOLVED: Pre-operative cardiovascular examination ICD-10-CM: Z01.810  ICD-9-CM: V72.81  Unknown - 9/30/2019          22    Final Diagnoses:   Principal Problem:    Syncope (10/10/2021)    Active Problems:    Hypertension ()      Type 2 diabetes mellitus without complication ([facility] (2/67/7749)      Atrial fibrillation ([facility] (9/14/2016)        Reason for Hospitalization:    Belia Palacios is a 79year old male that presented to the ED with a syncope episode while having a bowel movement, at the same time patient was complaining of abdominal pain. While inpatient, he was positive for orthostatic hypotension and a CT abdomen was performed and it shown the patient has colitis, he was started on oral Flagyl, cardiologist was consulted, and ECHO and Carotids were ordered. At this time the patient is stable, denies any dizziness, and is stable for discharge after testing. Syncope  -Echocardiogram to be completed today  -carotid Dopplers to be completed today  -orthostatic BP positive, but improved today  -continuous cardiac monitoring  - cardiologist consulted     Abdominal Pain  -CT abd: No abdominal or thoracic aortic aneurysm or dissection.  Thickening of the colon beginning at the splenic flexure and extending through the left colon consistent with colitis. -will continue Flagyl at discharge     Type 2 diabetes mellitus without complication ([address]  -continue Amaryl and Trulicity     Hypertension  -Blood pressure has remained soft  -Continue Accupril and decreased metoprolol daily     Atrial fibrillation (HCC)  -Rate controlled  -No acute symptoms  -Continue Eliquis    Discharge Medications:   Current Discharge Medication List      START taking these medications    Details   metroNIDAZOLE (FLAGYL) 500 mg tablet Take 1 Tablet by mouth every twelve (12) hours for 8 doses. Qty: 8 Tablet, Refills: 0  Start date: 10/11/2021, End date: 10/15/2021         CONTINUE these medications which have CHANGED    Details   metoprolol tartrate (LOPRESSOR) 50 mg tablet Take 1 Tablet by mouth daily. Qty: 30 Tablet, Refills: 0  Start date: 10/12/2021         CONTINUE these medications which have NOT CHANGED    Details   dulaglutide (TRULICITY) 1.5 BN/0.4 mL sub-q pen 1.5 mg by SubCUTAneous route every seven (7) days. tamsulosin (FLOMAX) 0.4 mg capsule TAKE 1 CAPSULE DAILY  Qty: 90 Cap, Refills: 0      quinapril (ACCUPRIL) 20 mg tablet TAKE 1 TABLET DAILY  Qty: 90 Tab, Refills: 3    Associated Diagnoses: Essential hypertension      ELIQUIS 5 mg tablet 2.5 mg two (2) times a day. Refills: 0      levETIRAcetam (KEPPRA) 750 mg tablet 750 mg.      finasteride (PROSCAR) 5 mg tablet Take 1 Tab by mouth daily. Qty: 90 Tab, Refills: 3      omeprazole (PRILOSEC) 20 mg capsule Take 20 mg by mouth daily. multivitamins-minerals-lutein (CENTRUM SILVER) Tab Take 1 Tab by mouth.        glimepiride (AMARYL) 4 mg tablet Take 4 mg by mouth every morning. 1/2 tablet twice a day       latanoprost (XALATAN) 0.005 % ophthalmic solution Administer 1 Drop to both eyes daily. pravastatin (PRAVACHOL) 80 mg tablet Take 40 mg by mouth daily.       mupirocin (BACTROBAN) 2 % ointment Apply  to affected area daily. sildenafil citrate (VIAGRA) 100 mg tablet Take 1 Tab by mouth daily as needed. Qty: 6 Tab, Refills: 6      glucose blood VI test strips (ASCENSIA AUTODISC VI, ONE TOUCH ULTRA TEST VI) strip 100 Each. Follow up Care:    1. Delbert Foster MD in 1-2 weeks. Follow-up Information     Follow up With Specialties Details Why Contact Info    Delbert Foster MD Family Medicine   [address] [place] [address] Avenue  [phone]          Patient Follow Up Instructions: Activity: Activity as tolerated  Diet:  Cardiac Diet    Condition at Discharge:  Stable  __________________________________________________________________    Disposition  Home or Self Care  ____________________________________________________________________    Code Status:  Full Code  ___________________________________________________________________    Discharge Exam:  Patient seen and examined by me on discharge day. Pertinent Findings:  Gen:    Not in distress  Chest: Clear lungs  CVS:   Regular rhythm. No edema  Abd:  Soft, not distended, not tender  Neuro:  Alert    CONSULTATIONS: Cardiology    Significant Diagnostic Studies:   Recent Results (from the past 24 hour(s))   CBC WITH AUTOMATED DIFF    Collection Time: 10/11/21  3:55 AM   Result Value Ref Range    WBC 11.0 4.6 - 13.2 K/uL    RBC 3.73 (L) 4.35 - 5.65 M/uL    HGB 13.0 13.0 - 16.0 g/dL    HCT 39.5 36.0 - 48.0 %    .9 (H) 78.0 - 100.0 FL    MCH 34.9 (H) 24.0 - 34.0 PG    MCHC 32.9 31.0 - 37.0 g/dL    RDW 13.4 11.6 - 14.5 %    PLATELET 004 920 - 662 K/uL    MPV 10.8 9.2 - 11.8 FL    NRBC 0.0 0.0  WBC    ABSOLUTE NRBC 0.00 0.00 - 0.01 K/uL    NEUTROPHILS 77 (H) 40 - 73 %    LYMPHOCYTES 10 (L) 21 - 52 %    MONOCYTES 12 (H) 3 - 10 %    EOSINOPHILS 1 0 - 5 %    BASOPHILS 0 0 - 2 %    IMMATURE GRANULOCYTES 0 0 - 0.5 %    ABS. NEUTROPHILS 8.5 (H) 1.8 - 8.0 K/UL    ABS. LYMPHOCYTES 1.1 0.9 - 3.6 K/UL    ABS.  MONOCYTES 1.3 (H) 0.05 - 1.2 K/UL    ABS. EOSINOPHILS 0.1 0.0 - 0.4 K/UL    ABS. BASOPHILS 0.0 0.0 - 0.1 K/UL    ABS. IMM. GRANS. 0.0 0.00 - 0.04 K/UL    DF AUTOMATED     METABOLIC PANEL, BASIC    Collection Time: 10/11/21  3:55 AM   Result Value Ref Range    Sodium 141 135 - 145 mmol/L    Potassium 3.8 3.2 - 5.1 mmol/L    Chloride 104 94 - 111 mmol/L    CO2 26 21 - 33 mmol/L    Anion gap 11 mmol/L    Glucose 126 (H) 70 - 110 mg/dL    BUN 16 9 - 21 mg/dL    Creatinine 0.90 0.8 - 1.50 mg/dL    BUN/Creatinine ratio 18      GFR est AA >60 ml/min/1.73m2    GFR est non-AA >60 ml/min/1.73m2    Calcium 9.2 8.5 - 10.5 mg/dL     CTA CHEST ABD PELV W WO CONT   Final Result   --------------      No abdominal or thoracic aortic aneurysm or dissection. Thickening of the colon beginning at the splenic flexure and extending through   the left colon consistent with colitis. Transverse, descending and sigmoid colon diverticulosis without definitive   evidence for diverticulitis. Cholelithiasis. CT HEAD WO CONT   Final Result      No acute intracranial abnormalities. Right temporal lobe hypodensity without mass effect most consistent with an old   infarct. XR CHEST PA LAT   Final Result      1. There is no evidence of a significant or acute cardiopulmonary process. This report has been generated using voice recognition software.        DUPLEX CAROTID BILATERAL    (Results Pending)     Signed:  MARIANELA Caldera  10/11/2021  1:21 PM

## 2021-10-11 NOTE — PROGRESS NOTES
Received care of pt. Daughter in room. Morning meds administered and tolerated well. No complaints at this time.

## 2021-10-12 NOTE — PROGRESS NOTES
Reason for Admission: Chart reviewed and noted patient presented to the ER after falling to the ground. The fall was witnessed and he was unconscious for about 30 seconds before he came too. It didn't appear to have any seizures or urinate or defecate on himself. He states he has had this before while having a bowel movement. Pt states he periodically feels lightheaded. He did report having some back and shoulder pain that started prior to the syncopal episode. Dx: Syncope    PMH: Arthritis, Cardiomegaly, DM, Hypercholesteremia, HTN, Kidney Stones, Anginal Pectoris                     RUR Score: 12%                    Plan for utilizing home health: TBD          PCP: First and Last name:  Micki Monteiro MD     Name of Practice:    Are you a current patient: Yes/No:    Approximate date of last visit:    Can you participate in a virtual visit with your PCP:                     Current Advanced Directive/Advance Care Plan: Prior      Healthcare Decision Maker: Lenor Mortimer  Click here to complete [employer] including selection of the Healthcare Decision Maker Relationship (ie \"Primary\")                             Transition of Care Plan: TBD    Care Management Interventions  PCP Verified by CM: Yes  Transition of Care Consult (CM Consult):  Discharge Planning  Current Support Network: Wife- Lenor Mortimer- [phone]. Patient will be going back home at discharge.

## 2021-10-14 ENCOUNTER — HOSPITAL ENCOUNTER (OUTPATIENT)
Dept: VASCULAR SURGERY | Age: 81
Discharge: HOME | End: 2021-10-14
Attending: NURSE PRACTITIONER
Payer: MEDICARE

## 2021-10-14 DIAGNOSIS — R55 SYNCOPE, UNSPECIFIED SYNCOPE TYPE: ICD-10-CM

## 2021-10-14 LAB
LEFT CCA DIST DIAS: 18.7 CM/S
LEFT CCA DIST SYS: 57.6 CM/S
LEFT CCA MID DIAS: 14.5 CM/S
LEFT CCA MID SYS: 67.8 CM/S
LEFT CCA PROX DIAS: 12.8 CM/S
LEFT CCA PROX SYS: 84.9 CM/S
LEFT ECA SYS: 82 CM/S
LEFT ICA DIST DIAS: 32.2 CM/S
LEFT ICA DIST SYS: 98.8 CM/S
LEFT ICA MID DIAS: 18.7 CM/S
LEFT ICA MID SYS: 54.3 CM/S
LEFT ICA PROX DIAS: 15.4 CM/S
LEFT ICA PROX SYS: 47.7 CM/S
LEFT ICA/CCA SYS: 1.71
LEFT VERTEBRAL SYS: 14.1 CM/S
RIGHT CCA DIST DIAS: 24.5 CM/S
RIGHT CCA DIST SYS: 85.3 CM/S
RIGHT CCA MID DIAS: 21 CM/S
RIGHT CCA MID SYS: 120 CM/S
RIGHT CCA PROX DIAS: 17 CM/S
RIGHT CCA PROX SYS: 86.2 CM/S
RIGHT ECA SYS: 69.1 CM/S
RIGHT ICA DIST DIAS: 21.4 CM/S
RIGHT ICA DIST SYS: 54.9 CM/S
RIGHT ICA MID DIAS: 22 CM/S
RIGHT ICA MID SYS: 67.5 CM/S
RIGHT ICA PROX DIAS: 17.6 CM/S
RIGHT ICA PROX SYS: 58.2 CM/S
RIGHT ICA/CCA SYS: 0.79
RIGHT VERTEBRAL DIAS: 17 CM/S
RIGHT VERTEBRAL SYS: 52.1 CM/S
VAS LEFT SUBCLAVIAN MID PSV: 51 CM/S
VAS RIGHT SUBCLAVIAN MID PSV: 93.3 CM/S

## 2021-10-14 PROCEDURE — 93880 EXTRACRANIAL BILAT STUDY: CPT

## 2021-11-04 ENCOUNTER — OFFICE VISIT (OUTPATIENT)
Dept: CARDIOLOGY CLINIC | Age: 81
End: 2021-11-04
Payer: MEDICARE

## 2021-11-04 VITALS
WEIGHT: 201.8 LBS | HEIGHT: 69 IN | SYSTOLIC BLOOD PRESSURE: 119 MMHG | BODY MASS INDEX: 29.89 KG/M2 | HEART RATE: 50 BPM | DIASTOLIC BLOOD PRESSURE: 60 MMHG

## 2021-11-04 DIAGNOSIS — I50.32 CHRONIC DIASTOLIC HEART FAILURE (HCC): Primary | ICD-10-CM

## 2021-11-04 DIAGNOSIS — I48.20 CHRONIC ATRIAL FIBRILLATION (HCC): ICD-10-CM

## 2021-11-04 DIAGNOSIS — R55 SYNCOPE AND COLLAPSE: ICD-10-CM

## 2021-11-04 DIAGNOSIS — E78.00 HYPERCHOLESTEREMIA: ICD-10-CM

## 2021-11-04 DIAGNOSIS — E11.9 TYPE 2 DIABETES MELLITUS WITHOUT COMPLICATION, UNSPECIFIED WHETHER LONG TERM INSULIN USE (HCC): ICD-10-CM

## 2021-11-04 DIAGNOSIS — I10 ESSENTIAL HYPERTENSION: ICD-10-CM

## 2021-11-04 PROCEDURE — 99214 OFFICE O/P EST MOD 30 MIN: CPT | Performed by: NURSE PRACTITIONER

## 2021-11-04 RX ORDER — METOPROLOL TARTRATE 50 MG/1
25 TABLET ORAL DAILY
Qty: 30 TABLET | Refills: 1
Start: 2021-11-04 | End: 2022-09-07 | Stop reason: DRUGHIGH

## 2021-11-04 NOTE — PATIENT INSTRUCTIONS
Heart-Healthy Diet: Care Instructions  Your Care Instructions     A heart-healthy diet has lots of vegetables, fruits, nuts, beans, and whole grains, and is low in salt. It limits foods that are high in saturated fat, such as meats, cheeses, and fried foods. It may be hard to change your diet, but even small changes can lower your risk of heart attack and heart disease. Follow-up care is a key part of your treatment and safety. Be sure to make and go to all appointments, and call your doctor if you are having problems. It's also a good idea to know your test results and keep a list of the medicines you take. How can you care for yourself at home? Watch your portions  · Use food labels to learn what the recommended servings are for the foods you eat. · Eat only the number of calories you need to stay at a healthy weight. If you need to lose weight, eat fewer calories than your body burns (through exercise and other physical activity). Eat more fruits and vegetables  · Eat a variety of fruit and vegetables every day. Dark green, deep orange, red, or yellow fruits and vegetables are especially good for you. Examples include spinach, carrots, peaches, and berries. · Keep carrots, celery, and other veggies handy for snacks. Buy fruit that is in season and store it where you can see it so that you will be tempted to eat it. · Cook dishes that have a lot of veggies in them, such as stir-fries and soups. Limit saturated fat  · Read food labels, and try to avoid saturated fats. They increase your risk of heart disease. · Use olive or canola oil when you cook. · Bake, broil, grill, or steam foods instead of frying them. · Choose lean meats instead of high-fat meats such as hot dogs and sausages. Cut off all visible fat when you prepare meat. · Eat fish, skinless poultry, and meat alternatives such as soy products instead of high-fat meats.  Soy products, such as tofu, may be especially good for your heart.  · Choose low-fat or fat-free milk and dairy products. Eat foods high in fiber  · Eat a variety of grain products every day. Include whole-grain foods that have lots of fiber and nutrients. Examples of whole-grain foods include oats, whole wheat bread, and brown rice. · Buy whole-grain breads and cereals, instead of white bread or pastries. Limit salt and sodium  · Limit how much salt and sodium you eat to help lower your blood pressure. · Taste food before you salt it. Add only a little salt when you think you need it. With time, your taste buds will adjust to less salt. · Eat fewer snack items, fast foods, and other high-salt, processed foods. Check food labels for the amount of sodium in packaged foods. · Choose low-sodium versions of canned goods (such as soups, vegetables, and beans). Limit sugar  · Limit drinks and foods with added sugar. These include candy, desserts, and soda pop. Limit alcohol  · Limit alcohol to no more than 2 drinks a day for men and 1 drink a day for women. Too much alcohol can cause health problems. When should you call for help? Watch closely for changes in your health, and be sure to contact your doctor if:    · You would like help planning heart-healthy meals. Where can you learn more? Go to http://www.spear.com/  Enter V137 in the search box to learn more about \"Heart-Healthy Diet: Care Instructions. \"  Current as of: December 17, 2020               Content Version: 13.0  © 3373-9379 [employer], [employer]. Care instructions adapted under license by [employer] (which disclaims liability or warranty for this information). If you have questions about a medical condition or this instruction, always ask your healthcare professional. [place] [address] any warranty or liability for your use of this information.

## 2021-11-04 NOTE — PROGRESS NOTES
1. Have you been to the ER, urgent care clinic since your last visit? Hospitalized since your last visit? Yes Where: [facility]    2. Have you seen or consulted any other health care providers outside of the [address] [place] [address] since your last visit? Include any pap smears or colon screening.       No

## 2021-11-04 NOTE — PROGRESS NOTES
HISTORY OF PRESENT ILLNESS  Dax Dominguez is a 79 y.o. male. 11/2021  Patient presents to f/u from hospitalization at [facility] for syncope. He was straining to have BM and passed out for approximately. He did not have significant injuries from collapse - and was thought to be related to dehydration and vasovagal from straining. During admission, he was found to have colitis and was Rx Flagyl. Discharge he denies recurrent syncopal events. He has completed antibiotics, denies chest pain, shortness of breath, palpitations, or edema. CHF  The history is provided by the patient. This is a chronic problem. The problem occurs daily. The problem has been gradually improving. Pertinent negatives include no shortness of breath. Shortness of Breath  The history is provided by the patient. This is a chronic problem. The problem occurs intermittently. The current episode started more than 1 week ago. The problem has been gradually improving. Pertinent negatives include no ear pain, no neck pain, no wheezing, no rash and no leg swelling. Associated medical issues include heart failure. Palpitations   The history is provided by the patient. This is a chronic problem. The current episode started more than 1 week ago. The problem has not changed since onset. The problem occurs every several days. Pertinent negatives include no shortness of breath. Risk factors include dyslipidemia, diabetes mellitus, hypertension and male gender. His past medical history is significant for atrial fibrillation. Review of Systems   Constitutional: Negative for chills and weight loss. HENT: Negative for ear pain and hearing loss. Eyes: Negative for blurred vision. Respiratory: Negative for shortness of breath, wheezing and stridor. Cardiovascular: Negative for palpitations and leg swelling. Gastrointestinal: Negative for heartburn. Musculoskeletal: Negative for myalgias and neck pain. Skin: Negative for rash. Neurological: Negative for tingling, tremors, focal weakness and loss of consciousness. Psychiatric/Behavioral: Negative for depression and suicidal ideas. No family history on file. Past Medical History:   Diagnosis Date    Arthritis     Cardiomegaly     Diabetes ([address]     Essential hypertension, benign     Hyperchloremia     Hypercholesteremia     Hypercholesteremia     Hypertension     Hypertriglyceridemia     Kidney stone     Nonspecific abnormal electrocardiogram (ECG) (EKG)     JUNCTIONAL RHYTHM    Nonspecific abnormal electrocardiogram (ECG) (EKG)     JUNCTIONAL RHYTHM     Other and unspecified angina pectoris 10/17/2014    exertional angina stable better now     Pre-operative cardiovascular examination     Shortness of breath 10/17/2014    exertional hcvd     Type II or unspecified type diabetes mellitus without mention of complication, not stated as uncontrolled     Type II or unspecified type diabetes mellitus without mention of complication, not stated as uncontrolled        Past Surgical History:   Procedure Laterality Date    HX HERNIA REPAIR      HX ORTHOPAEDIC  5-8-2012    right knee replacement    HX POLYPECTOMY      HX TONSILLECTOMY      HX TOTAL COLECTOMY         Social History     Tobacco Use    Smoking status: Never Smoker    Smokeless tobacco: Never Used   Substance Use Topics    Alcohol use: No       Allergies   Allergen Reactions    Aleve [Naproxen Sodium] Other (comments)     bleeding    Nsaids (Non-Steroidal Anti-Inflammatory Drug) Other (comments)     Risk for gastric erosions            Visit Vitals  /60   Pulse (!) 50   Ht 5' 9\" (1.753 m)   Wt 91.5 kg (201 lb 12.8 oz)   BMI 29.80 kg/m²     Physical Exam  Constitutional:       General: He is not in acute distress. Appearance: He is well-developed. He is not diaphoretic. HENT:      Head: Atraumatic. Mouth/Throat:      Pharynx: No oropharyngeal exudate.    Eyes:      General: No scleral icterus. Conjunctiva/sclera: Conjunctivae normal.   Neck:      Thyroid: No thyromegaly. Vascular: No JVD. Trachea: No tracheal deviation. Cardiovascular:      Rate and Rhythm: Normal rate. Rhythm irregular. Heart sounds: No murmur heard. No gallop. Pulmonary:      Effort: Pulmonary effort is normal. No respiratory distress. Breath sounds: Normal breath sounds. No stridor. No wheezing or rales. Chest:      Chest wall: No tenderness. Abdominal:      Palpations: Abdomen is soft. Tenderness: There is no abdominal tenderness. There is no guarding or rebound. Musculoskeletal:         General: No tenderness. Normal range of motion. Cervical back: Normal range of motion and neck supple. Lymphadenopathy:      Cervical: No cervical adenopathy. Skin:     General: Skin is warm. Neurological:      Mental Status: He is alert and oriented to person, place, and time. Motor: No abnormal muscle tone. Psychiatric:         Behavior: Behavior normal.       ekg atrial fibrillation with no acute st-t changes. ECHO 1/2016:  VERY TECHNICALLY DIFFICULT STUDY. NORMAL LEFT VENTRICULAR SYSTOLIC FUNCTION WITH AN ESTIMATED EJECTION FRACTION OF 55%. NORMAL DIASTOLIC FUNCTION. DILATED LEFT ATRIUM. NO HEMODYNAMICALLY SIGNIFICANT VALVULAR PATHOLOGY. NO EVIDENCE OF PULMONARY HYPERTENSION DUE TO LACK OF TR JET. NO PREVIOUS REPORT FOR COMPARISON. Echo 10/2021  Interpretation Summary  · LV: Calculated LVEF is 47%. Biplane method used to measure ejection fraction. Normal cavity size. Upper normal wall thickness. Globally reduced systolic function. · LA: Moderately dilated left atrium. Left Atrium volume index is 41.9 mL/m2. · RA: Mildly dilated right atrium. · IAS: Fr-92-94. Did not appear to have flow across IAS Agitated saline contrast study was performed. · AV: Aortic valve focal thickening present. Aortic valve leaflet calcification present.  Aortic valve mean gradient is 6 mmHg. Mild aortic valve stenosis is present. · MV: Mitral valve thickening. Moderate mitral valve regurgitation is present. · TV: Non-specific thickening of the tricuspid valve. Mild to moderate tricuspid valve regurgitation is present. Right Ventricular Arterial Pressure (RVSP) is 41 mmHg. Pulmonary hypertension found to be mild. · AO: Ascending aorta diameter = 4.1 cm. · PA: Mild pulmonary hypertension. Pulmonary arterial systolic pressure is 41 mmHg. ASSESSMENT and PLAN    ICD-10-CM ICD-9-CM    1. Chronic diastolic heart failure (HCC)  I50.32 428.32    2. Chronic atrial fibrillation (HCC)  I48.20 427.31 CARDIAC EVENT MONITOR   3. Essential hypertension  I10 401.9 metoprolol tartrate (LOPRESSOR) 50 mg tablet   4. Hypercholesteremia  E78.00 272.0    5. Type 2 diabetes mellitus without complication, unspecified whether long term insulin use (HCC)  E11.9 250.00    6. Syncope and collapse  R55 780.2 CARDIAC EVENT MONITOR     Orders Placed This Encounter    metoprolol tartrate (LOPRESSOR) 50 mg tablet     Sig: Take 0.5 Tablets by mouth daily. Dispense:  30 Tablet     Refill:  1     Follow-up and Dispositions    · Return in about 1 month (around 12/4/2021) for Follow up with Dr. Rasheed Bang. reviewed diet, exercise and weight control  cardiovascular risk and specific lipid/LDL goals reviewed  use of aspirin to prevent MI and TIA's discussed. Patient with chronic diastolic CHF- NYHA class II- stable mild dyspnea, no chest pain  on Eliquis 2.5 mg twice daily for oral anticoagulation. Scheduled for urology procedure-- okay to hold eliquis 2-3 days prior to procedure and resume postop asap. Can proceed to planned surgery with low cardiac risk. 11/2021  Seen following recent admission for syncope thought to be related to straining with BM and dehydration. Discharge summary and echocardiogram reviewed and discussed with patient.   Patient with chronic atrial fibrillation, at discharge he was to increase metoprolol to 50 mg/day however he has been taking 50 mg with his prior 25 mg for a total of 75 mg/day. Advised to only take 25 mg/day as his heart rate was noted to be 50 in office. Echocardiogram revealed LV function 47% with mild aortic stenosis moderate MR. We will continue to monitor. Will order event monitor to rule out significant arrhythmias or bradycardia.   Follow-up posttesting

## 2021-11-15 ENCOUNTER — TELEPHONE (OUTPATIENT)
Dept: CARDIOLOGY CLINIC | Age: 81
End: 2021-11-15

## 2021-11-15 NOTE — TELEPHONE ENCOUNTER
FYI: Per patient spouse, he refuses to wear the event monitor, he is sending it back to the company. Has a follow up on 12/22/21.

## 2021-12-22 ENCOUNTER — OFFICE VISIT (OUTPATIENT)
Dept: CARDIOLOGY CLINIC | Age: 81
End: 2021-12-22
Payer: MEDICARE

## 2021-12-22 VITALS
HEART RATE: 72 BPM | BODY MASS INDEX: 30.81 KG/M2 | OXYGEN SATURATION: 98 % | WEIGHT: 208 LBS | SYSTOLIC BLOOD PRESSURE: 117 MMHG | HEIGHT: 69 IN | DIASTOLIC BLOOD PRESSURE: 67 MMHG

## 2021-12-22 DIAGNOSIS — E11.9 TYPE 2 DIABETES MELLITUS WITHOUT COMPLICATION, UNSPECIFIED WHETHER LONG TERM INSULIN USE (HCC): ICD-10-CM

## 2021-12-22 DIAGNOSIS — I48.20 CHRONIC ATRIAL FIBRILLATION (HCC): ICD-10-CM

## 2021-12-22 DIAGNOSIS — R55 SYNCOPE AND COLLAPSE: ICD-10-CM

## 2021-12-22 DIAGNOSIS — I10 ESSENTIAL HYPERTENSION: ICD-10-CM

## 2021-12-22 DIAGNOSIS — K92.2 GASTROINTESTINAL HEMORRHAGE, UNSPECIFIED GASTROINTESTINAL HEMORRHAGE TYPE: ICD-10-CM

## 2021-12-22 DIAGNOSIS — I50.32 CHRONIC DIASTOLIC HEART FAILURE (HCC): Primary | ICD-10-CM

## 2021-12-22 DIAGNOSIS — E78.00 HYPERCHOLESTEREMIA: ICD-10-CM

## 2021-12-22 PROCEDURE — G8428 CUR MEDS NOT DOCUMENT: HCPCS | Performed by: INTERNAL MEDICINE

## 2021-12-22 PROCEDURE — G8536 NO DOC ELDER MAL SCRN: HCPCS | Performed by: INTERNAL MEDICINE

## 2021-12-22 PROCEDURE — G8432 DEP SCR NOT DOC, RNG: HCPCS | Performed by: INTERNAL MEDICINE

## 2021-12-22 PROCEDURE — G8417 CALC BMI ABV UP PARAM F/U: HCPCS | Performed by: INTERNAL MEDICINE

## 2021-12-22 PROCEDURE — 1101F PT FALLS ASSESS-DOCD LE1/YR: CPT | Performed by: INTERNAL MEDICINE

## 2021-12-22 PROCEDURE — 99214 OFFICE O/P EST MOD 30 MIN: CPT | Performed by: INTERNAL MEDICINE

## 2021-12-22 NOTE — PROGRESS NOTES
1. Have you been to the ER, urgent care clinic since your last visit? Hospitalized since your last visit? No    2. Have you seen or consulted any other health care providers outside of the [address] [place] [address] since your last visit? Include any pap smears or colon screening.  No

## 2021-12-22 NOTE — PROGRESS NOTES
HISTORY OF PRESENT ILLNESS  Dax Howard is a 79 y.o. male. 11/2021  Patient presents to f/u from hospitalization at [facility] for syncope. He was straining to have BM and passed out for approximately. He did not have significant injuries from collapse - and was thought to be related to dehydration and vasovagal from straining. During admission, he was found to have colitis and was Rx Flagyl. Discharge he denies recurrent syncopal events. He has completed antibiotics, denies chest pain, shortness of breath, palpitations, or edema. Shortness of Breath  The history is provided by the patient. This is a chronic problem. The problem occurs intermittently. The current episode started more than 1 week ago. The problem has been gradually improving. Pertinent negatives include no ear pain, no neck pain, no wheezing, no rash and no leg swelling. Associated medical issues include heart failure. Palpitations   The history is provided by the patient. This is a chronic problem. The current episode started more than 1 week ago. The problem has not changed since onset. The problem occurs every several days. Risk factors include dyslipidemia, diabetes mellitus, hypertension and male gender. His past medical history is significant for atrial fibrillation. Review of Systems   Constitutional: Negative for chills and weight loss. HENT: Negative for ear pain and hearing loss. Eyes: Negative for blurred vision. Respiratory: Negative for wheezing and stridor. Cardiovascular: Negative for palpitations and leg swelling. Gastrointestinal: Negative for heartburn. Musculoskeletal: Negative for myalgias and neck pain. Skin: Negative for rash. Neurological: Negative for tingling, tremors, focal weakness and loss of consciousness. Psychiatric/Behavioral: Negative for depression and suicidal ideas. No family history on file.     Past Medical History:   Diagnosis Date    Arthritis     Cardiomegaly     Diabetes [facility])     Essential hypertension, benign     Hyperchloremia     Hypercholesteremia     Hypercholesteremia     Hypertension     Hypertriglyceridemia     Kidney stone     Nonspecific abnormal electrocardiogram (ECG) (EKG)     JUNCTIONAL RHYTHM    Nonspecific abnormal electrocardiogram (ECG) (EKG)     JUNCTIONAL RHYTHM     Other and unspecified angina pectoris 10/17/2014    exertional angina stable better now     Pre-operative cardiovascular examination     Shortness of breath 10/17/2014    exertional hcvd     Type II or unspecified type diabetes mellitus without mention of complication, not stated as uncontrolled     Type II or unspecified type diabetes mellitus without mention of complication, not stated as uncontrolled        Past Surgical History:   Procedure Laterality Date    HX HERNIA REPAIR      HX ORTHOPAEDIC  5-8-2012    right knee replacement    HX POLYPECTOMY      HX TONSILLECTOMY      HX TOTAL COLECTOMY         Social History     Tobacco Use    Smoking status: Never Smoker    Smokeless tobacco: Never Used   Substance Use Topics    Alcohol use: No       Allergies   Allergen Reactions    Aleve [Naproxen Sodium] Other (comments)     bleeding    Nsaids (Non-Steroidal Anti-Inflammatory Drug) Other (comments)     Risk for gastric erosions            Visit Vitals  /67 (BP 1 Location: Left upper arm, BP Patient Position: Sitting, BP Cuff Size: Adult)   Pulse 72   Ht 5' 9\" (1.753 m)   Wt 94.3 kg (208 lb)   SpO2 98%   BMI 30.72 kg/m²     Physical Exam  Constitutional:       General: He is not in acute distress. Appearance: He is well-developed. He is not diaphoretic. HENT:      Head: Atraumatic. Mouth/Throat:      Pharynx: No oropharyngeal exudate. Eyes:      General: No scleral icterus. Conjunctiva/sclera: Conjunctivae normal.   Neck:      Thyroid: No thyromegaly. Vascular: No JVD. Trachea: No tracheal deviation.    Cardiovascular:      Rate and Rhythm: Normal rate. Rhythm irregular. Heart sounds: No murmur heard. No gallop. Pulmonary:      Effort: Pulmonary effort is normal. No respiratory distress. Breath sounds: Normal breath sounds. No stridor. No wheezing or rales. Chest:      Chest wall: No tenderness. Abdominal:      Palpations: Abdomen is soft. Tenderness: There is no abdominal tenderness. There is no guarding or rebound. Musculoskeletal:         General: No tenderness. Normal range of motion. Cervical back: Normal range of motion and neck supple. Lymphadenopathy:      Cervical: No cervical adenopathy. Skin:     General: Skin is warm. Neurological:      Mental Status: He is alert and oriented to person, place, and time. Motor: No abnormal muscle tone. Psychiatric:         Behavior: Behavior normal.       ekg atrial fibrillation with no acute st-t changes. ECHO 1/2016:  VERY TECHNICALLY DIFFICULT STUDY. NORMAL LEFT VENTRICULAR SYSTOLIC FUNCTION WITH AN ESTIMATED EJECTION FRACTION OF 55%. NORMAL DIASTOLIC FUNCTION. DILATED LEFT ATRIUM. NO HEMODYNAMICALLY SIGNIFICANT VALVULAR PATHOLOGY. NO EVIDENCE OF PULMONARY HYPERTENSION DUE TO LACK OF TR JET. NO PREVIOUS REPORT FOR COMPARISON. Echo 10/2021  Interpretation Summary  · LV: Calculated LVEF is 47%. Biplane method used to measure ejection fraction. Normal cavity size. Upper normal wall thickness. Globally reduced systolic function. · LA: Moderately dilated left atrium. Left Atrium volume index is 41.9 mL/m2. · RA: Mildly dilated right atrium. · IAS: Fr-92-94. Did not appear to have flow across IAS Agitated saline contrast study was performed. · AV: Aortic valve focal thickening present. Aortic valve leaflet calcification present. Aortic valve mean gradient is 6 mmHg. Mild aortic valve stenosis is present. · MV: Mitral valve thickening. Moderate mitral valve regurgitation is present. · TV: Non-specific thickening of the tricuspid valve.  Mild to moderate tricuspid valve regurgitation is present. Right Ventricular Arterial Pressure (RVSP) is 41 mmHg. Pulmonary hypertension found to be mild. · AO: Ascending aorta diameter = 4.1 cm. · PA: Mild pulmonary hypertension. Pulmonary arterial systolic pressure is 41 mmHg. ASSESSMENT and PLAN    ICD-10-CM ICD-9-CM    1. Chronic diastolic heart failure (HCC)  I50.32 428.32    2. Chronic atrial fibrillation (HCC)  I48.20 427.31    3. Essential hypertension  I10 401.9    4. Hypercholesteremia  E78.00 272.0    5. Type 2 diabetes mellitus without complication, unspecified whether long term insulin use (HCC)  E11.9 250.00    6. Syncope and collapse  R55 780.2    7. Gastrointestinal hemorrhage, unspecified gastrointestinal hemorrhage type  K92.2 578.9      No orders of the defined types were placed in this encounter. Follow-up and Dispositions    · Return in about 6 months (around 6/22/2022). reviewed diet, exercise and weight control  cardiovascular risk and specific lipid/LDL goals reviewed  use of aspirin to prevent MI and TIA's discussed. Patient with chronic diastolic CHF- NYHA class II- stable mild dyspnea, no chest pain  on Eliquis 2.5 mg twice daily for oral anticoagulation. Scheduled for urology procedure-- okay to hold eliquis 2-3 days prior to procedure and resume postop asap. Can proceed to planned surgery with low cardiac risk. 11/2021  Seen following recent admission for syncope thought to be related to straining with BM and dehydration. Discharge summary and echocardiogram reviewed and discussed with patient. Patient with chronic atrial fibrillation, at discharge he was to increase metoprolol to 50 mg/day however he has been taking 50 mg with his prior 25 mg for a total of 75 mg/day. Advised to only take 25 mg/day as his heart rate was noted to be 50 in office. Echocardiogram revealed LV function 47% with mild aortic stenosis moderate MR. We will continue to monitor.   Will order event monitor to rule out significant arrhythmias or bradycardia. Seen for follow-up. Patient declined cardiac monitor. No further episodes of dizziness or syncope. Currently on metoprolol 25 mg once daily. Continue low-dose Eliquis due to history of recurrent GI bleed. Follow-up in 6 months.

## 2022-03-18 PROBLEM — I50.32 CHRONIC DIASTOLIC CONGESTIVE HEART FAILURE (HCC): Status: ACTIVE | Noted: 2018-02-21

## 2022-03-19 PROBLEM — R55 SYNCOPE: Status: ACTIVE | Noted: 2021-10-10

## 2022-03-19 PROBLEM — E66.01 SEVERE OBESITY (HCC): Status: ACTIVE | Noted: 2018-11-07

## 2022-07-14 ENCOUNTER — OFFICE VISIT (OUTPATIENT)
Dept: CARDIOLOGY CLINIC | Age: 82
End: 2022-07-14
Payer: MEDICARE

## 2022-07-14 VITALS
BODY MASS INDEX: 29.92 KG/M2 | DIASTOLIC BLOOD PRESSURE: 77 MMHG | HEART RATE: 74 BPM | OXYGEN SATURATION: 99 % | HEIGHT: 69 IN | WEIGHT: 202 LBS | SYSTOLIC BLOOD PRESSURE: 124 MMHG

## 2022-07-14 DIAGNOSIS — Z79.01 CURRENT USE OF LONG TERM ANTICOAGULATION: ICD-10-CM

## 2022-07-14 DIAGNOSIS — E78.00 HYPERCHOLESTEREMIA: ICD-10-CM

## 2022-07-14 DIAGNOSIS — I48.20 CHRONIC ATRIAL FIBRILLATION (HCC): Primary | ICD-10-CM

## 2022-07-14 DIAGNOSIS — R55 SYNCOPE AND COLLAPSE: ICD-10-CM

## 2022-07-14 DIAGNOSIS — I10 ESSENTIAL HYPERTENSION: ICD-10-CM

## 2022-07-14 DIAGNOSIS — I50.32 CHRONIC DIASTOLIC HEART FAILURE (HCC): ICD-10-CM

## 2022-07-14 PROCEDURE — 1123F ACP DISCUSS/DSCN MKR DOCD: CPT | Performed by: INTERNAL MEDICINE

## 2022-07-14 PROCEDURE — G8754 DIAS BP LESS 90: HCPCS | Performed by: INTERNAL MEDICINE

## 2022-07-14 PROCEDURE — 1101F PT FALLS ASSESS-DOCD LE1/YR: CPT | Performed by: INTERNAL MEDICINE

## 2022-07-14 PROCEDURE — G8536 NO DOC ELDER MAL SCRN: HCPCS | Performed by: INTERNAL MEDICINE

## 2022-07-14 PROCEDURE — G8427 DOCREV CUR MEDS BY ELIG CLIN: HCPCS | Performed by: INTERNAL MEDICINE

## 2022-07-14 PROCEDURE — G8432 DEP SCR NOT DOC, RNG: HCPCS | Performed by: INTERNAL MEDICINE

## 2022-07-14 PROCEDURE — 99214 OFFICE O/P EST MOD 30 MIN: CPT | Performed by: INTERNAL MEDICINE

## 2022-07-14 PROCEDURE — G8752 SYS BP LESS 140: HCPCS | Performed by: INTERNAL MEDICINE

## 2022-07-14 PROCEDURE — G8417 CALC BMI ABV UP PARAM F/U: HCPCS | Performed by: INTERNAL MEDICINE

## 2022-07-14 RX ORDER — AMLODIPINE BESYLATE 5 MG/1
TABLET ORAL
COMMUNITY
Start: 2022-04-30 | End: 2022-09-07

## 2022-07-14 RX ORDER — LANOLIN ALCOHOL/MO/W.PET/CERES
1000 CREAM (GRAM) TOPICAL DAILY
COMMUNITY

## 2022-07-14 RX ORDER — TOLTERODINE 4 MG/1
CAPSULE, EXTENDED RELEASE ORAL
COMMUNITY
Start: 2022-05-05

## 2022-07-14 RX ORDER — MELATONIN
1000 DAILY
COMMUNITY

## 2022-07-14 NOTE — PROGRESS NOTES
1. Have you been to the ER, urgent care clinic since your last visit? Hospitalized since your last visit? No    2. Have you seen or consulted any other health care providers outside of the [address] [place] [address] since your last visit? Include any pap smears or colon screening.  No

## 2022-07-14 NOTE — PROGRESS NOTES
HISTORY OF PRESENT ILLNESS  Dax Alfaro is a 79 y.o. male. 11/2021  Patient presents to f/u from hospitalization at [facility] for syncope. He was straining to have BM and passed out for approximately. He did not have significant injuries from collapse - and was thought to be related to dehydration and vasovagal from straining. During admission, he was found to have colitis and was Rx Flagyl. Discharge he denies recurrent syncopal events. He has completed antibiotics, denies chest pain, shortness of breath, palpitations, or edema. Shortness of Breath  The history is provided by the patient. This is a chronic problem. The problem occurs intermittently. The current episode started more than 1 week ago. The problem has been gradually improving. Pertinent negatives include no ear pain, no neck pain, no wheezing, no rash and no leg swelling. Associated medical issues include heart failure. Palpitations   The history is provided by the patient. This is a chronic problem. The current episode started more than 1 week ago. The problem has not changed since onset. The problem occurs every several days. Associated symptoms include shortness of breath. Risk factors include dyslipidemia, diabetes mellitus, hypertension and male gender. His past medical history is significant for atrial fibrillation. Follow-up  Associated symptoms include shortness of breath. Review of Systems   Constitutional: Negative for chills and weight loss. HENT: Negative for ear pain and hearing loss. Eyes: Negative for blurred vision. Respiratory: Positive for shortness of breath. Negative for wheezing and stridor. Cardiovascular: Negative for palpitations and leg swelling. Gastrointestinal: Negative for heartburn. Musculoskeletal: Negative for myalgias and neck pain. Skin: Negative for rash. Neurological: Negative for tingling, tremors, focal weakness and loss of consciousness.    Psychiatric/Behavioral: Negative for depression and suicidal ideas. No family history on file. Past Medical History:   Diagnosis Date    Arthritis     Cardiomegaly     Diabetes ([facility] [address]     Essential hypertension, benign     Hyperchloremia     Hypercholesteremia     Hypercholesteremia     Hypertension     Hypertriglyceridemia     Kidney stone     Nonspecific abnormal electrocardiogram (ECG) (EKG)     JUNCTIONAL RHYTHM    Nonspecific abnormal electrocardiogram (ECG) (EKG)     JUNCTIONAL RHYTHM     Other and unspecified angina pectoris 10/17/2014    exertional angina stable better now     Pre-operative cardiovascular examination     Shortness of breath 10/17/2014    exertional hcvd     Type II or unspecified type diabetes mellitus without mention of complication, not stated as uncontrolled     Type II or unspecified type diabetes mellitus without mention of complication, not stated as uncontrolled        Past Surgical History:   Procedure Laterality Date    HX HERNIA REPAIR      HX ORTHOPAEDIC  5-8-2012    right knee replacement    HX POLYPECTOMY      HX TONSILLECTOMY      HX TOTAL COLECTOMY         Social History     Tobacco Use    Smoking status: Never Smoker    Smokeless tobacco: Never Used   Substance Use Topics    Alcohol use: No       Allergies   Allergen Reactions    Aleve [Naproxen Sodium] Other (comments)     bleeding    Nsaids (Non-Steroidal Anti-Inflammatory Drug) Other (comments)     Risk for gastric erosions            Visit Vitals  /77 (BP 1 Location: Left upper arm, BP Patient Position: Sitting, BP Cuff Size: Adult)   Pulse 74   Ht 5' 9\" (1.753 m)   Wt 91.6 kg (202 lb)   SpO2 99%   BMI 29.83 kg/m²     Physical Exam  Constitutional:       General: He is not in acute distress. Appearance: He is well-developed. He is not diaphoretic. HENT:      Head: Atraumatic. Mouth/Throat:      Pharynx: No oropharyngeal exudate. Eyes:      General: No scleral icterus.      Conjunctiva/sclera: Conjunctivae normal.   Neck:      Thyroid: No thyromegaly. Vascular: No JVD. Trachea: No tracheal deviation. Cardiovascular:      Rate and Rhythm: Normal rate. Rhythm irregular. Heart sounds: No murmur heard. No gallop. Pulmonary:      Effort: Pulmonary effort is normal. No respiratory distress. Breath sounds: Normal breath sounds. No stridor. No wheezing or rales. Chest:      Chest wall: No tenderness. Abdominal:      Palpations: Abdomen is soft. Tenderness: There is no abdominal tenderness. There is no guarding or rebound. Musculoskeletal:         General: No tenderness. Normal range of motion. Cervical back: Normal range of motion and neck supple. Lymphadenopathy:      Cervical: No cervical adenopathy. Skin:     General: Skin is warm. Neurological:      Mental Status: He is alert and oriented to person, place, and time. Motor: No abnormal muscle tone. Psychiatric:         Behavior: Behavior normal.       ekg atrial fibrillation with no acute st-t changes. ECHO 1/2016:  VERY TECHNICALLY DIFFICULT STUDY. NORMAL LEFT VENTRICULAR SYSTOLIC FUNCTION WITH AN ESTIMATED EJECTION FRACTION OF 55%. NORMAL DIASTOLIC FUNCTION. DILATED LEFT ATRIUM. NO HEMODYNAMICALLY SIGNIFICANT VALVULAR PATHOLOGY. NO EVIDENCE OF PULMONARY HYPERTENSION DUE TO LACK OF TR JET. NO PREVIOUS REPORT FOR COMPARISON. Echo 10/2021  Interpretation Summary  · LV: Calculated LVEF is 47%. Biplane method used to measure ejection fraction. Normal cavity size. Upper normal wall thickness. Globally reduced systolic function. · LA: Moderately dilated left atrium. Left Atrium volume index is 41.9 mL/m2. · RA: Mildly dilated right atrium. · IAS: Fr-92-94. Did not appear to have flow across IAS Agitated saline contrast study was performed. · AV: Aortic valve focal thickening present. Aortic valve leaflet calcification present. Aortic valve mean gradient is 6 mmHg.  Mild aortic valve stenosis is present. · MV: Mitral valve thickening. Moderate mitral valve regurgitation is present. · TV: Non-specific thickening of the tricuspid valve. Mild to moderate tricuspid valve regurgitation is present. Right Ventricular Arterial Pressure (RVSP) is 41 mmHg. Pulmonary hypertension found to be mild. · AO: Ascending aorta diameter = 4.1 cm. · PA: Mild pulmonary hypertension. Pulmonary arterial systolic pressure is 41 mmHg. I Have personally reviewed recent relevant labs available and discussed with patient  3/2022-lipid hemoglobin A1c  6/2022-renal function panel  ASSESSMENT and PLAN    ICD-10-CM ICD-9-CM    1. Chronic atrial fibrillation (HCC)  I48.20 427.31     Stable rate controlled continue treatment   2. Heart failure with preserved ejection fraction  I50.32 428.32     Stable continue treatment class II   3. Essential hypertension  I10 401.9     Stable monitor   4. Hypercholesteremia  E78.00 272.0     Continue treatment lab with PCP   5. Syncope and collapse  R55 780.2     No recurrence stable monitor   6. Current use of long term anticoagulation  Z79.01 V58.61     Continue for A. fib monitor     No orders of the defined types were placed in this encounter. Follow-up and Dispositions    · Return in about 6 months (around 1/14/2023). reviewed diet, exercise and weight control  cardiovascular risk and specific lipid/LDL goals reviewed  use of aspirin to prevent MI and TIA's discussed. Patient with chronic diastolic CHF- NYHA class II- stable mild dyspnea, no chest pain  on Eliquis 2.5 mg twice daily for oral anticoagulation. Scheduled for urology procedure-- okay to hold eliquis 2-3 days prior to procedure and resume postop asap. Can proceed to planned surgery with low cardiac risk. 11/2021  Seen following recent admission for syncope thought to be related to straining with BM and dehydration. Discharge summary and echocardiogram reviewed and discussed with patient.   Patient with chronic atrial fibrillation, at discharge he was to increase metoprolol to 50 mg/day however he has been taking 50 mg with his prior 25 mg for a total of 75 mg/day. Advised to only take 25 mg/day as his heart rate was noted to be 50 in office. Echocardiogram revealed LV function 47% with mild aortic stenosis moderate MR. We will continue to monitor. Will order event monitor to rule out significant arrhythmias or bradycardia. Seen for follow-up. Patient declined cardiac monitor. No further episodes of dizziness or syncope. Currently on metoprolol 25 mg once daily. Continue low-dose Eliquis due to history of recurrent GI bleed. Follow-up in 6 months.  7/2022  Stable cardiac status. No recurrence of syncope.   Rate controlled continue anticoagulation valvular disease stable monitor

## 2022-09-07 ENCOUNTER — OFFICE VISIT (OUTPATIENT)
Dept: CARDIOLOGY CLINIC | Age: 82
End: 2022-09-07
Payer: MEDICARE

## 2022-09-07 VITALS
WEIGHT: 194 LBS | HEART RATE: 66 BPM | BODY MASS INDEX: 28.73 KG/M2 | SYSTOLIC BLOOD PRESSURE: 101 MMHG | OXYGEN SATURATION: 98 % | DIASTOLIC BLOOD PRESSURE: 65 MMHG | HEIGHT: 69 IN

## 2022-09-07 DIAGNOSIS — I48.20 CHRONIC ATRIAL FIBRILLATION (HCC): Primary | ICD-10-CM

## 2022-09-07 DIAGNOSIS — Z79.01 CURRENT USE OF LONG TERM ANTICOAGULATION: ICD-10-CM

## 2022-09-07 DIAGNOSIS — I10 ESSENTIAL HYPERTENSION: ICD-10-CM

## 2022-09-07 DIAGNOSIS — I50.32 CHRONIC DIASTOLIC HEART FAILURE (HCC): Chronic | ICD-10-CM

## 2022-09-07 DIAGNOSIS — R55 SYNCOPE AND COLLAPSE: ICD-10-CM

## 2022-09-07 DIAGNOSIS — E78.00 HYPERCHOLESTEREMIA: ICD-10-CM

## 2022-09-07 PROCEDURE — 1101F PT FALLS ASSESS-DOCD LE1/YR: CPT | Performed by: NURSE PRACTITIONER

## 2022-09-07 PROCEDURE — G8752 SYS BP LESS 140: HCPCS | Performed by: NURSE PRACTITIONER

## 2022-09-07 PROCEDURE — G8536 NO DOC ELDER MAL SCRN: HCPCS | Performed by: NURSE PRACTITIONER

## 2022-09-07 PROCEDURE — G8432 DEP SCR NOT DOC, RNG: HCPCS | Performed by: NURSE PRACTITIONER

## 2022-09-07 PROCEDURE — 1123F ACP DISCUSS/DSCN MKR DOCD: CPT | Performed by: NURSE PRACTITIONER

## 2022-09-07 PROCEDURE — 99214 OFFICE O/P EST MOD 30 MIN: CPT | Performed by: NURSE PRACTITIONER

## 2022-09-07 PROCEDURE — G8754 DIAS BP LESS 90: HCPCS | Performed by: NURSE PRACTITIONER

## 2022-09-07 PROCEDURE — G8417 CALC BMI ABV UP PARAM F/U: HCPCS | Performed by: NURSE PRACTITIONER

## 2022-09-07 PROCEDURE — G8427 DOCREV CUR MEDS BY ELIG CLIN: HCPCS | Performed by: NURSE PRACTITIONER

## 2022-09-07 RX ORDER — METOPROLOL SUCCINATE 25 MG/1
TABLET, EXTENDED RELEASE ORAL
COMMUNITY
Start: 2022-08-13

## 2022-09-07 RX ORDER — LATANOPROST 50 UG/ML
SOLUTION/ DROPS OPHTHALMIC
COMMUNITY
Start: 2022-08-25

## 2022-09-07 NOTE — PATIENT INSTRUCTIONS
Stop taking Amlodipine    Continue all other medications. After the recommended changes have been made in blood pressure medicines, patient advised to keep BP/HR(pulse rate) chart twice daily and bring us results in next 4 to 5 days. Patient may send the results via \"My Chart\" if desired. Please rest for 5-10 minutes before checking blood pressure. Sit on a comfortable chair without crossing the legs and put your arm on a table. We recommend that you use an upper arm cuff. Check the blood pressure 3 times each time you check the blood pressure and record the lowest reading. If you check the blood pressure in both arms, use the higher reading.

## 2022-09-07 NOTE — PROGRESS NOTES
HISTORY OF PRESENT ILLNESS  Dax Howard is a 79 y.o. male. 11/2021  Patient presents to f/u from hospitalization at [facility] for syncope. He was straining to have BM and passed out for approximately. He did not have significant injuries from collapse - and was thought to be related to dehydration and vasovagal from straining. During admission, he was found to have colitis and was Rx Flagyl. Discharge he denies recurrent syncopal events. He has completed antibiotics, denies chest pain, shortness of breath, palpitations, or edema. 9/2022  Patient presents with neighbor to follow-up for episode of hypotension with rapid heart rate. He reports episode of dizziness with blurred vision and noted blood pressure to be 80s over 50s. He contacted his neighbor who came over to sit with him blood pressure was taken multiple times and noted to have heart rate in the 120s at one point was in 160s but not sustained. He complains of dizziness with position change. He denies chest pain, palpitations or edema. Follow-up  Pertinent negatives include no shortness of breath. Shortness of Breath  The history is provided by the Patient. This is a chronic problem. The problem occurs intermittently. The current episode started more than 1 week ago. The problem has been gradually improving. Pertinent negatives include no ear pain, no neck pain, no wheezing, no rash and no leg swelling. Associated medical issues include heart failure. Palpitations   The history is provided by the Patient. This is a chronic problem. The current episode started more than 1 week ago. The problem has not changed since onset. The problem occurs every several days. Associated symptoms include dizziness. Pertinent negatives include no shortness of breath. Risk factors include dyslipidemia, diabetes mellitus, hypertension and male gender. His past medical history is significant for atrial fibrillation.      Review of Systems   Constitutional:  Negative for chills and weight loss. HENT:  Negative for ear pain and hearing loss. Eyes:  Positive for blurred vision. Respiratory:  Negative for shortness of breath, wheezing and stridor. Cardiovascular:  Negative for palpitations and leg swelling. Gastrointestinal:  Negative for heartburn. Musculoskeletal:  Negative for myalgias and neck pain. Skin:  Negative for rash. Neurological:  Positive for dizziness. Negative for tingling, tremors, focal weakness and loss of consciousness. Psychiatric/Behavioral:  Negative for depression and suicidal ideas. History reviewed. No pertinent family history.     Past Medical History:   Diagnosis Date    Arthritis     Cardiomegaly     Diabetes ([facility] [address]     Essential hypertension, benign     Hyperchloremia     Hypercholesteremia     Hypercholesteremia     Hypertension     Hypertriglyceridemia     Kidney stone     Nonspecific abnormal electrocardiogram (ECG) (EKG)     JUNCTIONAL RHYTHM    Nonspecific abnormal electrocardiogram (ECG) (EKG)     JUNCTIONAL RHYTHM     Other and unspecified angina pectoris 10/17/2014    exertional angina stable better now     Pre-operative cardiovascular examination     Shortness of breath 10/17/2014    exertional hcvd     Type II or unspecified type diabetes mellitus without mention of complication, not stated as uncontrolled     Type II or unspecified type diabetes mellitus without mention of complication, not stated as uncontrolled        Past Surgical History:   Procedure Laterality Date    HX HERNIA REPAIR      HX ORTHOPAEDIC  5-8-2012    right knee replacement    HX POLYPECTOMY      HX TONSILLECTOMY      HX TOTAL COLECTOMY         Social History     Tobacco Use    Smoking status: Never    Smokeless tobacco: Never   Substance Use Topics    Alcohol use: No       Allergies   Allergen Reactions    Aleve [Naproxen Sodium] Other (comments)     bleeding    Nsaids (Non-Steroidal Anti-Inflammatory Drug) Other (comments)     Risk for gastric erosions            Visit Vitals  /65 (BP 1 Location: Left upper arm, BP Patient Position: Sitting, BP Cuff Size: Small adult)   Pulse 66   Ht 5' 9\" (1.753 m)   Wt 88 kg (194 lb)   SpO2 98%   BMI 28.65 kg/m²     Physical Exam  Vitals and nursing note reviewed. Constitutional:       General: He is not in acute distress. Appearance: He is well-developed. He is not diaphoretic. HENT:      Head: Atraumatic. Mouth/Throat:      Pharynx: No oropharyngeal exudate. Eyes:      General: No scleral icterus. Conjunctiva/sclera: Conjunctivae normal.   Neck:      Thyroid: No thyromegaly. Vascular: No JVD. Trachea: No tracheal deviation. Cardiovascular:      Rate and Rhythm: Normal rate. Rhythm irregular. Heart sounds: No murmur heard. No gallop. Pulmonary:      Effort: Pulmonary effort is normal. No respiratory distress. Breath sounds: Normal breath sounds. No stridor. No wheezing, rhonchi or rales. Chest:      Chest wall: No tenderness. Abdominal:      Palpations: Abdomen is soft. Tenderness: There is no abdominal tenderness. There is no guarding or rebound. Musculoskeletal:         General: No tenderness. Normal range of motion. Cervical back: Normal range of motion and neck supple. Right lower leg: No edema. Left lower leg: No edema. Lymphadenopathy:      Cervical: No cervical adenopathy. Skin:     General: Skin is warm. Neurological:      Mental Status: He is alert and oriented to person, place, and time. Motor: No abnormal muscle tone. Psychiatric:         Behavior: Behavior normal.     ekg atrial fibrillation with no acute st-t changes. ECHO 1/2016:  VERY TECHNICALLY DIFFICULT STUDY. NORMAL LEFT VENTRICULAR SYSTOLIC FUNCTION WITH AN ESTIMATED EJECTION FRACTION OF 55%. NORMAL DIASTOLIC FUNCTION. DILATED LEFT ATRIUM. NO HEMODYNAMICALLY SIGNIFICANT VALVULAR PATHOLOGY.    NO EVIDENCE OF PULMONARY HYPERTENSION DUE TO LACK OF TR JET.   NO PREVIOUS REPORT FOR COMPARISON. Echo 10/2021  Interpretation Summary  LV: Calculated LVEF is 47%. Biplane method used to measure ejection fraction. Normal cavity size. Upper normal wall thickness. Globally reduced systolic function. LA: Moderately dilated left atrium. Left Atrium volume index is 41.9 mL/m2. RA: Mildly dilated right atrium. IAS: Fr-92-94. Did not appear to have flow across IAS Agitated saline contrast study was performed. AV: Aortic valve focal thickening present. Aortic valve leaflet calcification present. Aortic valve mean gradient is 6 mmHg. Mild aortic valve stenosis is present. MV: Mitral valve thickening. Moderate mitral valve regurgitation is present. TV: Non-specific thickening of the tricuspid valve. Mild to moderate tricuspid valve regurgitation is present. Right Ventricular Arterial Pressure (RVSP) is 41 mmHg. Pulmonary hypertension found to be mild. AO: Ascending aorta diameter = 4.1 cm. PA: Mild pulmonary hypertension. Pulmonary arterial systolic pressure is 41 mmHg. I Have personally reviewed recent relevant labs available and discussed with patient  3/2022-lipid hemoglobin A1c  6/2022-renal function panel  ASSESSMENT and PLAN    ICD-10-CM ICD-9-CM    1. Chronic atrial fibrillation (HCC)  I48.20 427.31     Stable rate controlled continue treatment      2. Heart failure with preserved ejection fraction  I50.32 428.32     Stable continue treatment class II      3. Essential hypertension  I10 401.9     Blood pressure low  - d/c Amlodipine, monitor. 4. Hypercholesteremia  E78.00 272.0       5. Current use of long term anticoagulation  Z79.01 V58.61     Continue for A. fib monitor      6. Syncope and collapse  R55 780.2     No recurrence stable monitor        No orders of the defined types were placed in this encounter. Follow-up and Dispositions    Return in about 2 months (around 11/7/2022).            reviewed diet, exercise and weight control  cardiovascular risk and specific lipid/LDL goals reviewed  use of aspirin to prevent MI and TIA's discussed. Patient with chronic diastolic CHF- NYHA class II- stable mild dyspnea, no chest pain  on Eliquis 2.5 mg twice daily for oral anticoagulation. Scheduled for urology procedure-- okay to hold eliquis 2-3 days prior to procedure and resume postop asap. Can proceed to planned surgery with low cardiac risk. 11/2021  Seen following recent admission for syncope thought to be related to straining with BM and dehydration. Discharge summary and echocardiogram reviewed and discussed with patient. Patient with chronic atrial fibrillation, at discharge he was to increase metoprolol to 50 mg/day however he has been taking 50 mg with his prior 25 mg for a total of 75 mg/day. Advised to only take 25 mg/day as his heart rate was noted to be 50 in office. Echocardiogram revealed LV function 47% with mild aortic stenosis moderate MR. We will continue to monitor. Will order event monitor to rule out significant arrhythmias or bradycardia. Seen for follow-up. Patient declined cardiac monitor. No further episodes of dizziness or syncope. Currently on metoprolol 25 mg once daily. Continue low-dose Eliquis due to history of recurrent GI bleed. Follow-up in 6 months.  7/2022  Stable cardiac status. No recurrence of syncope. Rate controlled continue anticoagulation valvular disease stable monitor  9/2022  Patient with episode of dizziness and hypotension with tachycardia. Patient with history of chronic atrial fibrillation rate controlled on Toprol-XL and anticoagulated with Eliquis. Systolic blood pressure 342 in office today and has not taken amlodipine as of yet. Will discontinue amlodipine and monitor home BP chart. If BP remains low will consider decrease quinapril.

## 2022-09-07 NOTE — PROGRESS NOTES
1. Have you been to the ER, urgent care clinic since your last visit? Hospitalized since your last visit? No    2. Have you seen or consulted any other health care providers outside of the [address] [place] [address] since your last visit? Include any pap smears or colon screening.  Yes Where: PCP

## 2022-11-15 ENCOUNTER — OFFICE VISIT (OUTPATIENT)
Dept: CARDIOLOGY CLINIC | Age: 82
End: 2022-11-15
Payer: MEDICARE

## 2022-11-15 VITALS
DIASTOLIC BLOOD PRESSURE: 78 MMHG | WEIGHT: 194 LBS | HEIGHT: 69 IN | HEART RATE: 77 BPM | OXYGEN SATURATION: 98 % | SYSTOLIC BLOOD PRESSURE: 139 MMHG | BODY MASS INDEX: 28.73 KG/M2

## 2022-11-15 DIAGNOSIS — Z79.01 CURRENT USE OF LONG TERM ANTICOAGULATION: ICD-10-CM

## 2022-11-15 DIAGNOSIS — I48.20 CHRONIC ATRIAL FIBRILLATION (HCC): Primary | ICD-10-CM

## 2022-11-15 DIAGNOSIS — I10 ESSENTIAL HYPERTENSION: ICD-10-CM

## 2022-11-15 DIAGNOSIS — E78.00 HYPERCHOLESTEREMIA: ICD-10-CM

## 2022-11-15 DIAGNOSIS — I50.32 CHRONIC DIASTOLIC HEART FAILURE (HCC): ICD-10-CM

## 2022-11-15 PROCEDURE — 1101F PT FALLS ASSESS-DOCD LE1/YR: CPT | Performed by: NURSE PRACTITIONER

## 2022-11-15 PROCEDURE — G8752 SYS BP LESS 140: HCPCS | Performed by: NURSE PRACTITIONER

## 2022-11-15 PROCEDURE — 3074F SYST BP LT 130 MM HG: CPT | Performed by: NURSE PRACTITIONER

## 2022-11-15 PROCEDURE — G8427 DOCREV CUR MEDS BY ELIG CLIN: HCPCS | Performed by: NURSE PRACTITIONER

## 2022-11-15 PROCEDURE — 3078F DIAST BP <80 MM HG: CPT | Performed by: NURSE PRACTITIONER

## 2022-11-15 PROCEDURE — 99214 OFFICE O/P EST MOD 30 MIN: CPT | Performed by: NURSE PRACTITIONER

## 2022-11-15 PROCEDURE — 1123F ACP DISCUSS/DSCN MKR DOCD: CPT | Performed by: NURSE PRACTITIONER

## 2022-11-15 PROCEDURE — G8432 DEP SCR NOT DOC, RNG: HCPCS | Performed by: NURSE PRACTITIONER

## 2022-11-15 PROCEDURE — G8417 CALC BMI ABV UP PARAM F/U: HCPCS | Performed by: NURSE PRACTITIONER

## 2022-11-15 PROCEDURE — G8754 DIAS BP LESS 90: HCPCS | Performed by: NURSE PRACTITIONER

## 2022-11-15 PROCEDURE — G8536 NO DOC ELDER MAL SCRN: HCPCS | Performed by: NURSE PRACTITIONER

## 2022-11-15 RX ORDER — LANOLIN ALCOHOL/MO/W.PET/CERES
CREAM (GRAM) TOPICAL
COMMUNITY

## 2022-11-15 RX ORDER — AMLODIPINE BESYLATE 5 MG/1
5 TABLET ORAL DAILY
Qty: 90 TABLET | Refills: 1
Start: 2022-11-15

## 2022-11-15 NOTE — PROGRESS NOTES
1. Have you been to the ER, urgent care clinic since your last visit? Hospitalized since your last visit? No    2. Have you seen or consulted any other health care providers outside of the [address] [place] [address] since your last visit? Include any pap smears or colon screening.  No

## 2022-11-15 NOTE — PATIENT INSTRUCTIONS
Resume Amlodipine 5 mg / day    After the recommended changes have been made in blood pressure medicines, patient advised to keep BP/HR(pulse rate) chart twice daily and bring us results in next 4 to 5 days. Patient may send the results via \"My Chart\" if desired. Please rest for 5-10 minutes before checking blood pressure. Sit on a comfortable chair without crossing the legs and put your arm on a table. We recommend that you use an upper arm cuff. Check the blood pressure in AM prior to medications and 3 hours after medications.

## 2022-11-15 NOTE — PROGRESS NOTES
HISTORY OF PRESENT ILLNESS  Dax Pickens is a 79 y.o. male. 11/2021  Patient presents to f/u from hospitalization at [facility] for syncope. He was straining to have BM and passed out for approximately. He did not have significant injuries from collapse - and was thought to be related to dehydration and vasovagal from straining. During admission, he was found to have colitis and was Rx Flagyl. Discharge he denies recurrent syncopal events. He has completed antibiotics, denies chest pain, shortness of breath, palpitations, or edema. 9/2022  Patient presents with neighbor to follow-up for episode of hypotension with rapid heart rate. He reports episode of dizziness with blurred vision and noted blood pressure to be 80s over 50s. He contacted his neighbor who came over to sit with him blood pressure was taken multiple times and noted to have heart rate in the 120s at one point was in 160s but not sustained. He complains of dizziness with position change. He denies chest pain, palpitations or edema. 11/2022  Patient seen in follow up for hypertension. Reports home readings 190s. He reports episode of recent blurred vision and inappropriate speech which lasted 15 minutes and resolved. He denies chest pain, shortness of breath, palpitations or edema. Follow-up  The history is provided by the Patient and medical records. Pertinent negatives include no shortness of breath. Shortness of Breath  The history is provided by the Patient. This is a chronic problem. The problem occurs intermittently. The current episode started more than 1 week ago. The problem has been gradually improving. Pertinent negatives include no ear pain, no neck pain, no wheezing, no rash and no leg swelling. Associated medical issues include heart failure. Palpitations   The history is provided by the Patient. This is a chronic problem. The current episode started more than 1 week ago. The problem has not changed since onset. The problem occurs every several days. Associated symptoms include dizziness. Pertinent negatives include no shortness of breath. Risk factors include dyslipidemia, diabetes mellitus, hypertension and male gender. His past medical history is significant for atrial fibrillation. Review of Systems   Constitutional:  Negative for chills and weight loss. HENT:  Negative for ear pain and hearing loss. Eyes:  Positive for blurred vision. Respiratory:  Negative for shortness of breath, wheezing and stridor. Cardiovascular:  Negative for palpitations and leg swelling. Gastrointestinal:  Negative for heartburn. Musculoskeletal:  Negative for myalgias and neck pain. Skin:  Negative for rash. Neurological:  Positive for dizziness. Negative for tingling, tremors, focal weakness and loss of consciousness. Psychiatric/Behavioral:  Negative for depression and suicidal ideas. History reviewed. No pertinent family history.     Past Medical History:   Diagnosis Date    Arthritis     Cardiomegaly     Diabetes ([facility] [address]     Essential hypertension, benign     Hyperchloremia     Hypercholesteremia     Hypercholesteremia     Hypertension     Hypertriglyceridemia     Kidney stone     Nonspecific abnormal electrocardiogram (ECG) (EKG)     JUNCTIONAL RHYTHM    Nonspecific abnormal electrocardiogram (ECG) (EKG)     JUNCTIONAL RHYTHM     Other and unspecified angina pectoris 10/17/2014    exertional angina stable better now     Pre-operative cardiovascular examination     Shortness of breath 10/17/2014    exertional hcvd     Type II or unspecified type diabetes mellitus without mention of complication, not stated as uncontrolled     Type II or unspecified type diabetes mellitus without mention of complication, not stated as uncontrolled        Past Surgical History:   Procedure Laterality Date    HX HERNIA REPAIR      HX ORTHOPAEDIC  5-8-2012    right knee replacement    HX POLYPECTOMY      HX TONSILLECTOMY      HX TOTAL COLECTOMY Social History     Tobacco Use    Smoking status: Never    Smokeless tobacco: Never   Substance Use Topics    Alcohol use: No       Allergies   Allergen Reactions    Aleve [Naproxen Sodium] Other (comments)     bleeding    Nsaids (Non-Steroidal Anti-Inflammatory Drug) Other (comments)     Risk for gastric erosions            Visit Vitals  /78 (BP 1 Location: Left upper arm, BP Patient Position: Sitting, BP Cuff Size: Adult)   Pulse 77   Ht 5' 9\" (1.753 m)   Wt 88 kg (194 lb)   SpO2 98%   BMI 28.65 kg/m²     Physical Exam  Vitals and nursing note reviewed. Constitutional:       General: He is not in acute distress. Appearance: He is well-developed. He is not diaphoretic. HENT:      Head: Atraumatic. Mouth/Throat:      Pharynx: No oropharyngeal exudate. Eyes:      General: No scleral icterus. Conjunctiva/sclera: Conjunctivae normal.   Neck:      Thyroid: No thyromegaly. Vascular: No JVD. Trachea: No tracheal deviation. Cardiovascular:      Rate and Rhythm: Normal rate. Rhythm irregular. Heart sounds: No murmur heard. No gallop. Pulmonary:      Effort: Pulmonary effort is normal. No respiratory distress. Breath sounds: Normal breath sounds. No stridor. No wheezing, rhonchi or rales. Chest:      Chest wall: No tenderness. Abdominal:      Palpations: Abdomen is soft. Tenderness: There is no abdominal tenderness. There is no guarding or rebound. Musculoskeletal:         General: No tenderness. Normal range of motion. Cervical back: Normal range of motion and neck supple. Right lower leg: No edema. Left lower leg: No edema. Lymphadenopathy:      Cervical: No cervical adenopathy. Skin:     General: Skin is warm. Neurological:      Mental Status: He is alert and oriented to person, place, and time. Motor: No abnormal muscle tone.    Psychiatric:         Behavior: Behavior normal.     ekg atrial fibrillation with no acute st-t changes. ECHO 1/2016:  VERY TECHNICALLY DIFFICULT STUDY. NORMAL LEFT VENTRICULAR SYSTOLIC FUNCTION WITH AN ESTIMATED EJECTION FRACTION OF 55%. NORMAL DIASTOLIC FUNCTION. DILATED LEFT ATRIUM. NO HEMODYNAMICALLY SIGNIFICANT VALVULAR PATHOLOGY. NO EVIDENCE OF PULMONARY HYPERTENSION DUE TO LACK OF TR JET. NO PREVIOUS REPORT FOR COMPARISON. Echo 10/2021  Interpretation Summary  LV: Calculated LVEF is 47%. Biplane method used to measure ejection fraction. Normal cavity size. Upper normal wall thickness. Globally reduced systolic function. LA: Moderately dilated left atrium. Left Atrium volume index is 41.9 mL/m2. RA: Mildly dilated right atrium. IAS: Fr-92-94. Did not appear to have flow across IAS Agitated saline contrast study was performed. AV: Aortic valve focal thickening present. Aortic valve leaflet calcification present. Aortic valve mean gradient is 6 mmHg. Mild aortic valve stenosis is present. MV: Mitral valve thickening. Moderate mitral valve regurgitation is present. TV: Non-specific thickening of the tricuspid valve. Mild to moderate tricuspid valve regurgitation is present. Right Ventricular Arterial Pressure (RVSP) is 41 mmHg. Pulmonary hypertension found to be mild. AO: Ascending aorta diameter = 4.1 cm. PA: Mild pulmonary hypertension. Pulmonary arterial systolic pressure is 41 mmHg. I Have personally reviewed recent relevant labs available and discussed with patient  3/2022-lipid hemoglobin A1c  6/2022-renal function panel  ASSESSMENT and PLAN    ICD-10-CM ICD-9-CM    1. Chronic atrial fibrillation (HCC)  I48.20 427.31       2. Heart failure with preserved ejection fraction  I50.32 428.32       3. Current use of long term anticoagulation  Z79.01 V58.61       4. Essential hypertension  I10 401.9 amLODIPine (NORVASC) 5 mg tablet      5. Hypercholesteremia  E78.00 272.0         Orders Placed This Encounter    amLODIPine (NORVASC) 5 mg tablet     Sig: Take 1 Tablet by mouth daily. Dispense:  90 Tablet     Refill:  1       Follow-up and Dispositions    Return for Follow up with Dr. William Eugene in [place] as previously schedued. .             reviewed diet, exercise and weight control  cardiovascular risk and specific lipid/LDL goals reviewed  use of aspirin to prevent MI and TIA's discussed. Patient with chronic diastolic CHF- NYHA class II- stable mild dyspnea, no chest pain  on Eliquis 2.5 mg twice daily for oral anticoagulation. Scheduled for urology procedure-- okay to hold eliquis 2-3 days prior to procedure and resume postop asap. Can proceed to planned surgery with low cardiac risk. 11/2021  Seen following recent admission for syncope thought to be related to straining with BM and dehydration. Discharge summary and echocardiogram reviewed and discussed with patient. Patient with chronic atrial fibrillation, at discharge he was to increase metoprolol to 50 mg/day however he has been taking 50 mg with his prior 25 mg for a total of 75 mg/day. Advised to only take 25 mg/day as his heart rate was noted to be 50 in office. Echocardiogram revealed LV function 47% with mild aortic stenosis moderate MR. We will continue to monitor. Will order event monitor to rule out significant arrhythmias or bradycardia. Seen for follow-up. Patient declined cardiac monitor. No further episodes of dizziness or syncope. Currently on metoprolol 25 mg once daily. Continue low-dose Eliquis due to history of recurrent GI bleed. Follow-up in 6 months.  7/2022  Stable cardiac status. No recurrence of syncope. Rate controlled continue anticoagulation valvular disease stable monitor  9/2022  Patient with episode of dizziness and hypotension with tachycardia. Patient with history of chronic atrial fibrillation rate controlled on Toprol-XL and anticoagulated with Eliquis. Systolic blood pressure 844 in office today and has not taken amlodipine as of yet.   Will discontinue amlodipine and monitor home BP chart. If BP remains low will consider decrease quinapril. 11/2022  Patient with increased blood pressure by home blood pressure chart and reference. Will resume amlodipine at 5 mg/day follow home BP chart before and after medications. Patient with episode of blurred vision and difficulty with speech. Prior carotid PVL 10/21 reviewed and discussed with patient. Less than 50% stenosis in bilateral carotid arteries (mild plaque in bilateral internal carotid arteries). Patient with history of PAF reports is taking Eliquis consistently twice daily. Will follow home BP chart for further adjustment with medications. Patient to follow-up with Dr. Amauri Mcduffie in January as previously scheduled.

## 2023-01-10 ENCOUNTER — OFFICE VISIT (OUTPATIENT)
Dept: CARDIOLOGY CLINIC | Age: 83
End: 2023-01-10
Payer: MEDICARE

## 2023-01-10 VITALS
BODY MASS INDEX: 29.47 KG/M2 | DIASTOLIC BLOOD PRESSURE: 74 MMHG | HEIGHT: 69 IN | SYSTOLIC BLOOD PRESSURE: 134 MMHG | WEIGHT: 199 LBS | OXYGEN SATURATION: 99 % | HEART RATE: 78 BPM

## 2023-01-10 DIAGNOSIS — E78.00 HYPERCHOLESTEREMIA: ICD-10-CM

## 2023-01-10 DIAGNOSIS — I10 ESSENTIAL HYPERTENSION: ICD-10-CM

## 2023-01-10 DIAGNOSIS — Z79.01 CURRENT USE OF LONG TERM ANTICOAGULATION: ICD-10-CM

## 2023-01-10 DIAGNOSIS — I34.0 NONRHEUMATIC MITRAL VALVE REGURGITATION: ICD-10-CM

## 2023-01-10 DIAGNOSIS — I48.20 CHRONIC ATRIAL FIBRILLATION (HCC): Primary | ICD-10-CM

## 2023-01-10 DIAGNOSIS — I35.0 NONRHEUMATIC AORTIC VALVE STENOSIS: ICD-10-CM

## 2023-01-10 DIAGNOSIS — I50.32 CHRONIC DIASTOLIC HEART FAILURE (HCC): ICD-10-CM

## 2023-01-10 PROCEDURE — 1101F PT FALLS ASSESS-DOCD LE1/YR: CPT | Performed by: INTERNAL MEDICINE

## 2023-01-10 PROCEDURE — 3078F DIAST BP <80 MM HG: CPT | Performed by: INTERNAL MEDICINE

## 2023-01-10 PROCEDURE — 1123F ACP DISCUSS/DSCN MKR DOCD: CPT | Performed by: INTERNAL MEDICINE

## 2023-01-10 PROCEDURE — 3075F SYST BP GE 130 - 139MM HG: CPT | Performed by: INTERNAL MEDICINE

## 2023-01-10 PROCEDURE — G8417 CALC BMI ABV UP PARAM F/U: HCPCS | Performed by: INTERNAL MEDICINE

## 2023-01-10 PROCEDURE — G8536 NO DOC ELDER MAL SCRN: HCPCS | Performed by: INTERNAL MEDICINE

## 2023-01-10 PROCEDURE — G8432 DEP SCR NOT DOC, RNG: HCPCS | Performed by: INTERNAL MEDICINE

## 2023-01-10 PROCEDURE — G8427 DOCREV CUR MEDS BY ELIG CLIN: HCPCS | Performed by: INTERNAL MEDICINE

## 2023-01-10 PROCEDURE — 99214 OFFICE O/P EST MOD 30 MIN: CPT | Performed by: INTERNAL MEDICINE

## 2023-01-10 NOTE — PROGRESS NOTES
HISTORY OF PRESENT ILLNESS  Dax Foster is a 79 y.o. male. 11/2021  Patient presents to f/u from hospitalization at [facility] for syncope. He was straining to have BM and passed out for approximately. He did not have significant injuries from collapse - and was thought to be related to dehydration and vasovagal from straining. During admission, he was found to have colitis and was Rx Flagyl. Discharge he denies recurrent syncopal events. He has completed antibiotics, denies chest pain, shortness of breath, palpitations, or edema. 9/2022  Patient presents with neighbor to follow-up for episode of hypotension with rapid heart rate. He reports episode of dizziness with blurred vision and noted blood pressure to be 80s over 50s. He contacted his neighbor who came over to sit with him blood pressure was taken multiple times and noted to have heart rate in the 120s at one point was in 160s but not sustained. He complains of dizziness with position change. He denies chest pain, palpitations or edema. 11/2022  Patient seen in follow up for hypertension. Reports home readings 190s. He reports episode of recent blurred vision and inappropriate speech which lasted 15 minutes and resolved. He denies chest pain, shortness of breath, palpitations or edema. Follow-up  The history is provided by the Patient and medical records. Pertinent negatives include no shortness of breath. Shortness of Breath  The history is provided by the Patient. This is a chronic problem. The problem occurs intermittently. The current episode started more than 1 week ago. The problem has been gradually improving. Pertinent negatives include no ear pain, no neck pain, no wheezing, no rash and no leg swelling. Associated medical issues include heart failure. Palpitations   The history is provided by the Patient. This is a chronic problem. The current episode started more than 1 week ago. The problem has not changed since onset. The problem occurs every several days. Associated symptoms include dizziness. Pertinent negatives include no shortness of breath. Risk factors include dyslipidemia, diabetes mellitus, hypertension and male gender. His past medical history is significant for atrial fibrillation. Review of Systems   Constitutional:  Negative for chills and weight loss. HENT:  Negative for ear pain and hearing loss. Eyes:  Positive for blurred vision. Respiratory:  Negative for shortness of breath, wheezing and stridor. Cardiovascular:  Negative for palpitations and leg swelling. Gastrointestinal:  Negative for heartburn. Musculoskeletal:  Negative for myalgias and neck pain. Skin:  Negative for rash. Neurological:  Positive for dizziness. Negative for tingling, tremors, focal weakness and loss of consciousness. Psychiatric/Behavioral:  Negative for depression and suicidal ideas. No family history on file.     Past Medical History:   Diagnosis Date    Arthritis     Cardiomegaly     Diabetes ([facility] [address]     Essential hypertension, benign     Hyperchloremia     Hypercholesteremia     Hypercholesteremia     Hypertension     Hypertriglyceridemia     Kidney stone     Nonspecific abnormal electrocardiogram (ECG) (EKG)     JUNCTIONAL RHYTHM    Nonspecific abnormal electrocardiogram (ECG) (EKG)     JUNCTIONAL RHYTHM     Other and unspecified angina pectoris 10/17/2014    exertional angina stable better now     Pre-operative cardiovascular examination     Shortness of breath 10/17/2014    exertional hcvd     Type II or unspecified type diabetes mellitus without mention of complication, not stated as uncontrolled     Type II or unspecified type diabetes mellitus without mention of complication, not stated as uncontrolled        Past Surgical History:   Procedure Laterality Date    HX HERNIA REPAIR      HX ORTHOPAEDIC  5-8-2012    right knee replacement    HX POLYPECTOMY      HX TONSILLECTOMY      HX TOTAL COLECTOMY         Social History Tobacco Use    Smoking status: Never    Smokeless tobacco: Never   Substance Use Topics    Alcohol use: No       Allergies   Allergen Reactions    Aleve [Naproxen Sodium] Other (comments)     bleeding    Nsaids (Non-Steroidal Anti-Inflammatory Drug) Other (comments)     Risk for gastric erosions            Visit Vitals  /74 (BP 1 Location: Left upper arm, BP Patient Position: Sitting, BP Cuff Size: Adult)   Pulse 78   Ht 5' 9\" (1.753 m)   Wt 90.3 kg (199 lb)   SpO2 99%   BMI 29.39 kg/m²     Physical Exam  Vitals and nursing note reviewed. Constitutional:       General: He is not in acute distress. Appearance: He is well-developed. He is not diaphoretic. HENT:      Head: Atraumatic. Mouth/Throat:      Pharynx: No oropharyngeal exudate. Eyes:      General: No scleral icterus. Conjunctiva/sclera: Conjunctivae normal.   Neck:      Thyroid: No thyromegaly. Vascular: No JVD. Trachea: No tracheal deviation. Cardiovascular:      Rate and Rhythm: Normal rate. Rhythm irregular. Heart sounds: No murmur heard. No gallop. Pulmonary:      Effort: Pulmonary effort is normal. No respiratory distress. Breath sounds: Normal breath sounds. No stridor. No wheezing, rhonchi or rales. Chest:      Chest wall: No tenderness. Abdominal:      Palpations: Abdomen is soft. Tenderness: There is no abdominal tenderness. There is no guarding or rebound. Musculoskeletal:         General: No tenderness. Normal range of motion. Cervical back: Normal range of motion and neck supple. Right lower leg: No edema. Left lower leg: No edema. Lymphadenopathy:      Cervical: No cervical adenopathy. Skin:     General: Skin is warm. Neurological:      Mental Status: He is alert and oriented to person, place, and time. Motor: No abnormal muscle tone. Psychiatric:         Behavior: Behavior normal.     ekg atrial fibrillation with no acute st-t changes.   ECHO 1/2016:  VERY TECHNICALLY DIFFICULT STUDY. NORMAL LEFT VENTRICULAR SYSTOLIC FUNCTION WITH AN ESTIMATED EJECTION FRACTION OF 55%. NORMAL DIASTOLIC FUNCTION. DILATED LEFT ATRIUM. NO HEMODYNAMICALLY SIGNIFICANT VALVULAR PATHOLOGY. NO EVIDENCE OF PULMONARY HYPERTENSION DUE TO LACK OF TR JET. NO PREVIOUS REPORT FOR COMPARISON. Echo 10/2021  Interpretation Summary  LV: Calculated LVEF is 47%. Biplane method used to measure ejection fraction. Normal cavity size. Upper normal wall thickness. Globally reduced systolic function. LA: Moderately dilated left atrium. Left Atrium volume index is 41.9 mL/m2. RA: Mildly dilated right atrium. IAS: Fr-92-94. Did not appear to have flow across IAS Agitated saline contrast study was performed. AV: Aortic valve focal thickening present. Aortic valve leaflet calcification present. Aortic valve mean gradient is 6 mmHg. Mild aortic valve stenosis is present. MV: Mitral valve thickening. Moderate mitral valve regurgitation is present. TV: Non-specific thickening of the tricuspid valve. Mild to moderate tricuspid valve regurgitation is present. Right Ventricular Arterial Pressure (RVSP) is 41 mmHg. Pulmonary hypertension found to be mild. AO: Ascending aorta diameter = 4.1 cm. PA: Mild pulmonary hypertension. Pulmonary arterial systolic pressure is 41 mmHg. I Have personally reviewed recent relevant labs available and discussed with patient  3/2022-lipid hemoglobin A1c  6/2022-renal function panel  12/2022-lipid,cmp,  ASSESSMENT and PLAN    ICD-10-CM ICD-9-CM    1. Chronic atrial fibrillation (HCC)  I48.20 427.31     Stable rate controlled continue treatment      2. Heart failure with preserved ejection fraction  I50.32 428.32     Continue treatment compensated      3. Current use of long term anticoagulation  Z79.01 V58.61     Continue for A. fib      4. Essential hypertension  I10 401.9     Stable continue current treatment      5.  Hypercholesteremia  E78.00 272.0 Continue treatment with check labs PCP      6. Nonrheumatic mitral valve regurgitation  I34.0 424.0     Moderate on last echo continue treatment      7. Nonrheumatic aortic valve stenosis  I35.0 424.1     Mild monitor        No orders of the defined types were placed in this encounter. Follow-up and Dispositions    Return in about 6 months (around 7/10/2023). reviewed diet, exercise and weight control  cardiovascular risk and specific lipid/LDL goals reviewed  use of aspirin to prevent MI and TIA's discussed. Patient with chronic diastolic CHF- NYHA class II- stable mild dyspnea, no chest pain  on Eliquis 2.5 mg twice daily for oral anticoagulation. Scheduled for urology procedure-- okay to hold eliquis 2-3 days prior to procedure and resume postop asap. Can proceed to planned surgery with low cardiac risk. 11/2021  Seen following recent admission for syncope thought to be related to straining with BM and dehydration. Discharge summary and echocardiogram reviewed and discussed with patient. Patient with chronic atrial fibrillation, at discharge he was to increase metoprolol to 50 mg/day however he has been taking 50 mg with his prior 25 mg for a total of 75 mg/day. Advised to only take 25 mg/day as his heart rate was noted to be 50 in office. Echocardiogram revealed LV function 47% with mild aortic stenosis moderate MR. We will continue to monitor. Will order event monitor to rule out significant arrhythmias or bradycardia. Seen for follow-up. Patient declined cardiac monitor. No further episodes of dizziness or syncope. Currently on metoprolol 25 mg once daily. Continue low-dose Eliquis due to history of recurrent GI bleed. Follow-up in 6 months.  7/2022  Stable cardiac status. No recurrence of syncope. Rate controlled continue anticoagulation valvular disease stable monitor  9/2022  Patient with episode of dizziness and hypotension with tachycardia.   Patient with history of chronic atrial fibrillation rate controlled on Toprol-XL and anticoagulated with Eliquis. Systolic blood pressure 548 in office today and has not taken amlodipine as of yet. Will discontinue amlodipine and monitor home BP chart. If BP remains low will consider decrease quinapril. 11/2022  Patient with increased blood pressure by home blood pressure chart and reference. Will resume amlodipine at 5 mg/day follow home BP chart before and after medications. Patient with episode of blurred vision and difficulty with speech. Prior carotid PVL 10/21 reviewed and discussed with patient. Less than 50% stenosis in bilateral carotid arteries (mild plaque in bilateral internal carotid arteries). Patient with history of PAF reports is taking Eliquis consistently twice daily. Will follow home BP chart for further adjustment with medications. Patient to follow-up with Dr. Hazel Jacob in January as previously scheduled.   1/2023  Stable cardiac status continue current medical management monitor

## 2023-01-10 NOTE — PROGRESS NOTES
1. Have you been to the ER, urgent care clinic since your last visit? Hospitalized since your last visit? No    2. Have you seen or consulted any other health care providers outside of the [address] [place] [address] since your last visit? Include any pap smears or colon screening.  No

## 2023-07-13 ENCOUNTER — OFFICE VISIT (OUTPATIENT)
Age: 83
End: 2023-07-13

## 2023-07-13 VITALS
DIASTOLIC BLOOD PRESSURE: 59 MMHG | WEIGHT: 198 LBS | BODY MASS INDEX: 29.33 KG/M2 | SYSTOLIC BLOOD PRESSURE: 90 MMHG | OXYGEN SATURATION: 99 % | HEART RATE: 75 BPM | HEIGHT: 69 IN

## 2023-07-13 DIAGNOSIS — Z79.01 LONG TERM (CURRENT) USE OF ANTICOAGULANTS: ICD-10-CM

## 2023-07-13 DIAGNOSIS — I10 ESSENTIAL (PRIMARY) HYPERTENSION: ICD-10-CM

## 2023-07-13 DIAGNOSIS — I10 ESSENTIAL (PRIMARY) HYPERTENSION: Primary | ICD-10-CM

## 2023-07-13 DIAGNOSIS — I35.0 NONRHEUMATIC AORTIC (VALVE) STENOSIS: ICD-10-CM

## 2023-07-13 DIAGNOSIS — I50.32 CHRONIC DIASTOLIC (CONGESTIVE) HEART FAILURE (HCC): ICD-10-CM

## 2023-07-13 DIAGNOSIS — I48.20 CHRONIC ATRIAL FIBRILLATION, UNSPECIFIED (HCC): Primary | ICD-10-CM

## 2023-07-13 RX ORDER — QUINAPRIL 20 MG/1
20 TABLET ORAL DAILY
Qty: 30 TABLET | Refills: 5 | Status: SHIPPED | OUTPATIENT
Start: 2023-07-13

## 2023-07-13 ASSESSMENT — PATIENT HEALTH QUESTIONNAIRE - PHQ9
SUM OF ALL RESPONSES TO PHQ QUESTIONS 1-9: 0
SUM OF ALL RESPONSES TO PHQ9 QUESTIONS 1 & 2: 0
DEPRESSION UNABLE TO ASSESS: FUNCTIONAL CAPACITY MOTIVATION LIMITS ACCURACY
SUM OF ALL RESPONSES TO PHQ QUESTIONS 1-9: 0
2. FEELING DOWN, DEPRESSED OR HOPELESS: 0
SUM OF ALL RESPONSES TO PHQ QUESTIONS 1-9: 0
1. LITTLE INTEREST OR PLEASURE IN DOING THINGS: 0
SUM OF ALL RESPONSES TO PHQ QUESTIONS 1-9: 0

## 2023-07-13 NOTE — PROGRESS NOTES
HISTORY OF PRESENT ILLNESS  Alphonse Boss is a 79 y.o. male. 11/2021  Patient presents to f/u from hospitalization at [facility] for syncope. He was straining to have BM and passed out for approximately. He did not have significant injuries from collapse - and was thought to be related to dehydration and vasovagal from straining. During admission, he was found to have colitis and was Rx Flagyl. Discharge he denies recurrent syncopal events. He has completed antibiotics, denies chest pain, shortness of breath, palpitations, or edema. 9/2022  Patient presents with neighbor to follow-up for episode of hypotension with rapid heart rate. He reports episode of dizziness with blurred vision and noted blood pressure to be 80s over 50s. He contacted his neighbor who came over to sit with him blood pressure was taken multiple times and noted to have heart rate in the 120s at one point was in 160s but not sustained. He complains of dizziness with position change. He denies chest pain, palpitations or edema. 11/2022  Patient seen in follow up for hypertension. Reports home readings 190s. He reports episode of recent blurred vision and inappropriate speech which lasted 15 minutes and resolved. He denies chest pain, shortness of breath, palpitations or edema. Follow-up  The history is provided by the Patient and medical records. Pertinent negatives include no shortness of breath. Shortness of Breath  The history is provided by the Patient. This is a chronic problem. The problem occurs intermittently. The current episode started more than 1 week ago. The problem has been gradually improving. Pertinent negatives include no ear pain, no neck pain, no wheezing, no rash and no leg swelling. Associated medical issues include heart failure. Palpitations   The history is provided by the Patient. This is a chronic problem. The current episode started more than 1 week ago. The problem has not changed since onset. The problem

## 2023-07-13 NOTE — TELEPHONE ENCOUNTER
Requested Prescriptions     Pending Prescriptions Disp Refills    quinapril (ACCUPRIL) 20 MG tablet 30 tablet 0     Sig: Take 1 tablet by mouth daily

## 2023-07-13 NOTE — PROGRESS NOTES
1. Have you been to the ER, urgent care clinic since your last visit? Hospitalized since your last visit? No    2. Have you seen or consulted any other health care providers outside of the [address] [place] [address] since your last visit? Include any pap smears or colon screening.       No

## 2023-07-17 ENCOUNTER — TELEPHONE (OUTPATIENT)
Age: 83
End: 2023-07-17

## 2023-07-17 RX ORDER — LISINOPRIL 40 MG/1
40 TABLET ORAL DAILY
COMMUNITY

## 2023-07-17 NOTE — TELEPHONE ENCOUNTER
Pharmacy is asking for another alternative medication for Accupril. Patient states he has Iisinopril 40 mg at home can he use that.  Please advise

## 2023-07-17 NOTE — TELEPHONE ENCOUNTER
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 He voices understanding and acceptance of this advice and will call back if any further questions or concerns.